# Patient Record
Sex: FEMALE | Race: BLACK OR AFRICAN AMERICAN
[De-identification: names, ages, dates, MRNs, and addresses within clinical notes are randomized per-mention and may not be internally consistent; named-entity substitution may affect disease eponyms.]

---

## 2017-06-03 ENCOUNTER — HOSPITAL ENCOUNTER (EMERGENCY)
Dept: HOSPITAL 62 - ER | Age: 27
Discharge: LEFT BEFORE DISCHARGE | End: 2017-06-03
Payer: COMMERCIAL

## 2017-06-03 VITALS — SYSTOLIC BLOOD PRESSURE: 118 MMHG | DIASTOLIC BLOOD PRESSURE: 65 MMHG

## 2017-06-03 DIAGNOSIS — Z53.21: Primary | ICD-10-CM

## 2017-06-06 ENCOUNTER — HOSPITAL ENCOUNTER (EMERGENCY)
Dept: HOSPITAL 62 - ER | Age: 27
Discharge: HOME | End: 2017-06-06
Payer: MEDICAID

## 2017-06-06 VITALS — DIASTOLIC BLOOD PRESSURE: 68 MMHG | SYSTOLIC BLOOD PRESSURE: 115 MMHG

## 2017-06-06 DIAGNOSIS — R10.2: Primary | ICD-10-CM

## 2017-06-06 DIAGNOSIS — R11.2: ICD-10-CM

## 2017-06-06 DIAGNOSIS — R10.9: ICD-10-CM

## 2017-06-06 DIAGNOSIS — Z33.1: ICD-10-CM

## 2017-06-06 LAB
APPEARANCE UR: (no result)
BASOPHILS # BLD AUTO: 0 10^3/UL (ref 0–0.2)
BASOPHILS NFR BLD AUTO: 0.2 % (ref 0–2)
BILIRUB UR QL STRIP: NEGATIVE
EOSINOPHIL # BLD AUTO: 0.1 10^3/UL (ref 0–0.6)
EOSINOPHIL NFR BLD AUTO: 1 % (ref 0–6)
ERYTHROCYTE [DISTWIDTH] IN BLOOD BY AUTOMATED COUNT: 13.5 % (ref 11.5–14)
GLUCOSE UR STRIP-MCNC: NEGATIVE MG/DL
HCT VFR BLD CALC: 40.5 % (ref 36–47)
HGB BLD-MCNC: 13.5 G/DL (ref 12–15.5)
HGB HCT DIFFERENCE: 0
KETONES UR STRIP-MCNC: NEGATIVE MG/DL
LYMPHOCYTES # BLD AUTO: 2.4 10^3/UL (ref 0.5–4.7)
LYMPHOCYTES NFR BLD AUTO: 22 % (ref 13–45)
MCH RBC QN AUTO: 30.8 PG (ref 27–33.4)
MCHC RBC AUTO-ENTMCNC: 33.4 G/DL (ref 32–36)
MCV RBC AUTO: 92 FL (ref 80–97)
MONOCYTES # BLD AUTO: 0.8 10^3/UL (ref 0.1–1.4)
MONOCYTES NFR BLD AUTO: 7.2 % (ref 3–13)
NEUTROPHILS # BLD AUTO: 7.5 10^3/UL (ref 1.7–8.2)
NEUTS SEG NFR BLD AUTO: 69.6 % (ref 42–78)
NITRITE UR QL STRIP: NEGATIVE
PH UR STRIP: 7 [PH] (ref 5–9)
PROT UR STRIP-MCNC: NEGATIVE MG/DL
RBC # BLD AUTO: 4.39 10^6/UL (ref 3.72–5.28)
SP GR UR STRIP: 1.03
UROBILINOGEN UR-MCNC: 2 MG/DL (ref ?–2)
WBC # BLD AUTO: 10.8 10^3/UL (ref 4–10.5)

## 2017-06-06 PROCEDURE — 81001 URINALYSIS AUTO W/SCOPE: CPT

## 2017-06-06 PROCEDURE — 76817 TRANSVAGINAL US OBSTETRIC: CPT

## 2017-06-06 PROCEDURE — 93976 VASCULAR STUDY: CPT

## 2017-06-06 PROCEDURE — 84702 CHORIONIC GONADOTROPIN TEST: CPT

## 2017-06-06 PROCEDURE — 99284 EMERGENCY DEPT VISIT MOD MDM: CPT

## 2017-06-06 PROCEDURE — 85025 COMPLETE CBC W/AUTO DIFF WBC: CPT

## 2017-06-06 PROCEDURE — 36415 COLL VENOUS BLD VENIPUNCTURE: CPT

## 2017-06-06 NOTE — RADIOLOGY REPORT (SQ)
EXAM DESCRIPTION:  U/S OB TRANSVAG W/DOPPLER



COMPLETED DATE/TIME:  2017 7:48 pm



REASON FOR STUDY:  Pelvic pain, Pregnancy



COMPARISON:  None.



TECHNIQUE:  Transvaginal static and realtime grayscale images acquired of the pelvis. Additional dinh
cted spectral and color Doppler images recorded. All images stored on PACs.

[facility] [place].3



LIMITATIONS:  None.



FINDINGS:  UTERUS: No masses.  No anomalies.

GESTATIONAL SAC: Yes.

YOLK SAC: Yes.

FETAL POLE: No.

RIGHT ADNEXA: There is a 1.7 x 2.3 x 1.5 cm cyst.

No adnexal free fluid.

No adnexal masses.

LEFT ADNEXA: Normal ovary with normal vascular flow.

No adnexal free fluid.

No adnexal masses.

FREE FLUID: There is minimal free fluid in the right adnexa.

OTHER: There is a small hypoechoic area adjacent to the gestational sac consistent with a small subch
orionic bleed.  This measures 0.9 x 0.6 x 0.5 cm.



IMPRESSION:  Probable early intrauterine gestation.

No fetal heart activity this time.  Followup beta HCG and ultrasound is recommended.

Trimester of pregnancy:  First - 0 to 13 weeks.



TECHNICAL DOCUMENTATION:  JOB ID:  [id number]

  [employer]- All Rights Reserved

## 2017-06-06 NOTE — ER DOCUMENT REPORT
ED Medical Screen (RME)





- General


TRAVEL OUTSIDE OF THE U[place] IN LAST 30 DAYS: No





<ABERCROMBIE,JOHN F - Last Filed: 17 17:24>





<ABRAN DAVIS - Last Filed: 17 18:21>





- General


Chief Complaint: Abdominal Pain


Stated Complaint: ABDOMINAL PAIN


Time Seen by Provider: 17 17:12


Notes: 


27-year-old female  with last menses 2017 presents with 1 week of 

lower abdominal cramping.  She has had 3 positive pregnancy tests at home.  She 

is having no vaginal bleeding, vomiting, diarrhea, dysuria or hematuria.  She 

has had some nausea. (ABERCROMBIE,JOHN F)


I have greeted and performed a rapid initial assessment of this patient. A 

comprehensive ED assessment and evaluation of the patient, analysis of test 

results and completion of the medical decision making process will be conducted 

by additional ED providers. (ABRAN DAVIS)





- Related Data


Allergies/Adverse Reactions: 


 





No Known Allergies Allergy (Verified 17 17:13)


 











Past Medical History


Renal/ Medical History: Denies: Hx Peritoneal Dialysis


Psychiatric Medical History: Reports: Hx Attention Deficit Hyperactivity 

Disorder


Traumatic Medical History: Reports: Hx Fractures





- Immunizations


Hx Diphtheria, Pertussis, Tetanus Vaccination: Yes





<ABERCROMBIE,JOHN F - Last Filed: 17 17:24>





Physical Exam





<ABERCROMBIE,JOHN F - Last Filed: 17 17:24>





<ABRAN DAVIS - Last Filed: 17 18:21>





- Vital signs


Vitals: 





 











Temp Pulse Resp BP Pulse Ox


 


 98.1 F   84   20   118/72   99 


 


 17 16:45  17 16:45  17 16:45  17 16:45  17 16:45














- Notes


Notes: 


Exam shows heart to be normal, lungs normal, no peritoneal signs (ABERCROMBIE, JOHN F)





Course





- Laboratory


Result Diagrams: 


 17 17:20








<ABRAN DAVIS - Last Filed: 17 18:21>





- Vital Signs


Vital signs: 





 











Temp Pulse Resp BP Pulse Ox


 


 98.1 F   84   20   118/72   99 


 


 17 16:45  17 16:45  17 16:45  17 16:45  17 16:45














- Laboratory


Laboratory results interpreted by me: 





 











  17





  17:20 17:20


 


WBC  10.8 H 


 


Urine Urobilinogen   2.0 H














Scribe Documentation





- Scribe


Written by Scribe:: Ilia Hawkins 17 18:20


acting as scribe for :: Abercrombie





<ABRAN DAVIS - Last Filed: 17 18:21>

## 2017-06-06 NOTE — ER DOCUMENT REPORT
ED GI/





- General


Mode of Arrival: Ambulatory


Information source: Patient


TRAVEL OUTSIDE OF THE [place] IN LAST 30 DAYS: No





- HPI


Patient complains to provider of: Abdominal pain





<MELVIN KAYE - Last Filed: 06/06/17 21:08>





<NIKO UNDERWOOD - Last Filed: 06/06/17 22:36>





- General


Chief Complaint: Abdominal Pain


Stated Complaint: ABDOMINAL PAIN


Time Seen by Provider: 06/06/17 17:12


Notes: 


Patient is a 27 year old female that presents to the emergency department today 

with complaints of nausea and vomiting. Patient states she is pregnant but she 

is unsure how pregnant she is. Patient states her LMP was April 30th. Patient 

states she has had mild suprapubic abdominal pain.  (MELVIN KAYE)





- Related Data


Allergies/Adverse Reactions: 


 





No Known Allergies Allergy (Verified 06/06/17 17:13)


 











Past Medical History





- General


Information source: Patient





- Social History


Smoking Status: Unknown if Ever Smoked


Cigarette use (# per day): No


Frequency of alcohol use: None


Drug Abuse: None


Lives with: Family


Family History: Reviewed & Not Pertinent


Patient has suicidal ideation: No


Patient has homicidal ideation: No


Psychiatric Medical History: Reports: Hx Attention Deficit Hyperactivity 

Disorder


Traumatic Medical History: Reports: Hx Fractures


Surgical Hx: Negative





- Immunizations


Hx Diphtheria, Pertussis, Tetanus Vaccination: Yes





<MELVIN KAYE - Last Filed: 06/06/17 21:08>





Review of Systems





- Review of Systems


Constitutional: No symptoms reported


EENT: No symptoms reported


Cardiovascular: No symptoms reported


Respiratory: No symptoms reported


Gastrointestinal: See HPI, Abdominal pain, Nausea, Vomiting


Genitourinary: No symptoms reported


Female Genitourinary: See HPI, Pregnant


Musculoskeletal: No symptoms reported


Skin: No symptoms reported


Hematologic/Lymphatic: No symptoms reported


Neurological/Psychological: No symptoms reported


-: Yes All other systems reviewed and negative





<MELVIN KAYE - Last Filed: 06/06/17 21:08>





Physical Exam





<MELVIN KAYE - Last Filed: 06/06/17 21:08>





- Vital signs


Interpretation: Normal





- General


General appearance: Appears well, Alert





- HEENT


Head: Normocephalic, Atraumatic


Eyes: Normal


Pupils: PERRL





- Respiratory


Respiratory status: No respiratory distress


Chest status: Nontender


Breath sounds: Normal


Chest palpation: Normal





- Cardiovascular


Rhythm: Regular


Heart sounds: Normal auscultation


Murmur: No





- Abdominal


Inspection: Normal


Distension: No distension


Bowel sounds: Normal


Organomegaly: No organomegaly





- Back


Back: Normal, Nontender





- Extremities


General upper extremity: Normal inspection, Nontender, Normal color, Normal ROM

, Normal temperature


General lower extremity: Normal inspection, Nontender, Normal color, Normal ROM

, Normal temperature, Normal weight bearing.  No: Katherine's sign





- Neurological


Neuro grossly intact: Yes


Cognition: Normal


Orientation: AAOx4


[place] Coma Scale Eye Opening: Spontaneous


[place] Coma Scale Verbal: Oriented


[place] Coma Scale Motor: Obeys Commands


[place] Coma Scale Total: 15


Speech: Normal


Motor strength normal: LUE, RUE, LLE, RLE


Sensory: Normal





- Psychological


Associated symptoms: Normal affect, Normal mood





- Skin


Skin Temperature: Warm


Skin Moisture: Dry


Skin Color: Normal





<NIKO UNDERWOOD - Last Filed: 06/06/17 22:36>





- Vital signs


Vitals: 


 











Temp Pulse Resp BP Pulse Ox


 


 98.1 F   84   20   118/72   99 


 


 06/06/17 16:45  06/06/17 16:45  06/06/17 16:45  06/06/17 16:45  06/06/17 16:45














- Notes


Notes: 


Physical Exam:


 


General: Alert, appears well. 


 


HEENT: Normocephalic. Atraumatic. PERRL. Extraocular movements intact. 

Oropharynx clear.


 


Neck: Supple. Non-tender.


 


Respiratory: No respiratory distress. Clear and equal breath sounds bilaterally.


 


Cardiovascular: Regular rate and rhythm. 


 


Abdominal: Left pelvic tenderness with palpation. No distension. Normal Bowel 

Sounds. 


 


Back: Non-tender. No deformity or step off.


 


Extremities: Moves all four extremities.


Upper extremities: Normal inspection. Normal ROM.  


Lower extremities: Normal inspection. No edema. Normal ROM.


 


Neurological: Normal cognition. AAOx4. Normal speech.  


 


Psychological: Normal affect. Normal Mood. 


 


Skin: Warm. Dry. Normal color. (MELVIN KAYE)





- Genitourinary


Notes: 


deferred to ob/gyn (NIKO UNDERWOOD)





Course





- Laboratory


Result Diagrams: 


 06/06/17 17:20








<MELVIN KAYE - Last Filed: 06/06/17 21:08>





- Laboratory


Result Diagrams: 


 06/06/17 17:20








- Diagnostic Test


Radiology reviewed: Reports reviewed





<NIKO UNDERWOOD - Last Filed: 06/06/17 22:36>





- Re-evaluation


Re-evalutation: 





06/06/17 


Patient with very early pregnancy.  No evidence for ectopic pregnancy.  Patient 

is comfortable and appears well.  No urinary symptoms.  She has been given a 

copy of her blood work and ultrasound and is to follow-up with obstetrics.  

Understands and agrees with plan.  No pain at this time.  Stable for discharge.


 (NIKO UNDERWOOD)





- Vital Signs


Vital signs: 


 











Temp Pulse Resp BP Pulse Ox


 


 98.4 F   79   20   115/68   99 


 


 06/06/17 20:40  06/06/17 20:40  06/06/17 20:40  06/06/17 20:40  06/06/17 20:40














- Laboratory


Laboratory results interpreted by me: 


 











  06/06/17 06/06/17 06/06/17





  17:20 17:20 17:20


 


WBC  10.8 H  


 


Beta HCG, Quant   5729.30 H 


 


Urine Urobilinogen    2.0 H














Discharge





<MELVIN KAYE - Last Filed: 06/06/17 21:08>





<NIKO UNDERWOOD - Last Filed: 06/06/17 22:36>





- Discharge


Clinical Impression: 


 Early stage of pregnancy, Pelvic pain





Condition: Stable


Disposition: HOME, SELF-CARE


Instructions:  Pelvic Pain ([facility]), Pregnancy ([facility])


Forms:  Return to Work


Referrals: 


[facility] [Provider Group] - Follow up as needed


Scribe Attestation: 





06/06/17 22:36


I personally performed the services described in the documentation, reviewed 

and edited the documentation which was dictated to the scribe in my presence, 

and it accurately records my words and actions. (NIKO UNDERWOOD)





Scribe Documentation





- Scribe


Written by Ilia:: Ilia Salazar, 6/6/2017 2053


acting as scribe for :: Nereyda





<MELVIN KAYE - Last Filed: 06/06/17 21:08>

## 2017-06-29 ENCOUNTER — HOSPITAL ENCOUNTER (EMERGENCY)
Dept: HOSPITAL 62 - ER | Age: 27
Discharge: HOME | End: 2017-06-29
Payer: COMMERCIAL

## 2017-06-29 VITALS — DIASTOLIC BLOOD PRESSURE: 72 MMHG | SYSTOLIC BLOOD PRESSURE: 127 MMHG

## 2017-06-29 DIAGNOSIS — Z79.899: ICD-10-CM

## 2017-06-29 DIAGNOSIS — Z87.891: ICD-10-CM

## 2017-06-29 DIAGNOSIS — O26.891: Primary | ICD-10-CM

## 2017-06-29 DIAGNOSIS — Z3A.08: ICD-10-CM

## 2017-06-29 DIAGNOSIS — R10.9: ICD-10-CM

## 2017-06-29 DIAGNOSIS — O20.8: ICD-10-CM

## 2017-06-29 LAB
ALBUMIN SERPL-MCNC: 4.1 G/DL (ref 3.5–5)
ALP SERPL-CCNC: 76 U/L (ref 38–126)
ALT SERPL-CCNC: 27 U/L (ref 9–52)
ANION GAP SERPL CALC-SCNC: 11 MMOL/L (ref 5–19)
APPEARANCE UR: (no result)
AST SERPL-CCNC: 20 U/L (ref 14–36)
BASOPHILS # BLD AUTO: 0 10^3/UL (ref 0–0.2)
BASOPHILS NFR BLD AUTO: 0.2 % (ref 0–2)
BILIRUB DIRECT SERPL-MCNC: 0.3 MG/DL (ref 0–0.4)
BILIRUB SERPL-MCNC: 0.4 MG/DL (ref 0.2–1.3)
BILIRUB UR QL STRIP: NEGATIVE
BUN SERPL-MCNC: 9 MG/DL (ref 7–20)
CALCIUM: 9.1 MG/DL (ref 8.4–10.2)
CHLORIDE SERPL-SCNC: 103 MMOL/L (ref 98–107)
CO2 SERPL-SCNC: 23 MMOL/L (ref 22–30)
CREAT SERPL-MCNC: 0.71 MG/DL (ref 0.52–1.25)
EOSINOPHIL # BLD AUTO: 0.1 10^3/UL (ref 0–0.6)
EOSINOPHIL NFR BLD AUTO: 1 % (ref 0–6)
ERYTHROCYTE [DISTWIDTH] IN BLOOD BY AUTOMATED COUNT: 13.6 % (ref 11.5–14)
GLUCOSE SERPL-MCNC: 85 MG/DL (ref 75–110)
GLUCOSE UR STRIP-MCNC: NEGATIVE MG/DL
HCT VFR BLD CALC: 38.9 % (ref 36–47)
HGB BLD-MCNC: 12.9 G/DL (ref 12–15.5)
HGB HCT DIFFERENCE: -0.2
KETONES UR STRIP-MCNC: NEGATIVE MG/DL
LYMPHOCYTES # BLD AUTO: 2.1 10^3/UL (ref 0.5–4.7)
LYMPHOCYTES NFR BLD AUTO: 18.9 % (ref 13–45)
MCH RBC QN AUTO: 30.7 PG (ref 27–33.4)
MCHC RBC AUTO-ENTMCNC: 33.2 G/DL (ref 32–36)
MCV RBC AUTO: 92 FL (ref 80–97)
MONOCYTES # BLD AUTO: 0.7 10^3/UL (ref 0.1–1.4)
MONOCYTES NFR BLD AUTO: 6.2 % (ref 3–13)
NEUTROPHILS # BLD AUTO: 8 10^3/UL (ref 1.7–8.2)
NEUTS SEG NFR BLD AUTO: 73.7 % (ref 42–78)
NITRITE UR QL STRIP: NEGATIVE
PH UR STRIP: 7 [PH] (ref 5–9)
POTASSIUM SERPL-SCNC: 4 MMOL/L (ref 3.6–5)
PROT SERPL-MCNC: 7 G/DL (ref 6.3–8.2)
PROT UR STRIP-MCNC: NEGATIVE MG/DL
RBC # BLD AUTO: 4.21 10^6/UL (ref 3.72–5.28)
SODIUM SERPL-SCNC: 136.6 MMOL/L (ref 137–145)
SP GR UR STRIP: 1.02
UROBILINOGEN UR-MCNC: NEGATIVE MG/DL (ref ?–2)
WBC # BLD AUTO: 10.9 10^3/UL (ref 4–10.5)

## 2017-06-29 PROCEDURE — 99284 EMERGENCY DEPT VISIT MOD MDM: CPT

## 2017-06-29 PROCEDURE — 84702 CHORIONIC GONADOTROPIN TEST: CPT

## 2017-06-29 PROCEDURE — 81001 URINALYSIS AUTO W/SCOPE: CPT

## 2017-06-29 PROCEDURE — 36415 COLL VENOUS BLD VENIPUNCTURE: CPT

## 2017-06-29 PROCEDURE — 93976 VASCULAR STUDY: CPT

## 2017-06-29 PROCEDURE — 80053 COMPREHEN METABOLIC PANEL: CPT

## 2017-06-29 PROCEDURE — 76817 TRANSVAGINAL US OBSTETRIC: CPT

## 2017-06-29 PROCEDURE — 85025 COMPLETE CBC W/AUTO DIFF WBC: CPT

## 2017-06-29 NOTE — RADIOLOGY REPORT (SQ)
EXAM DESCRIPTION:  U/S OB TRANSVAG W/DOPPLER



COMPLETED DATE/TIME:  6/29/2017 6:51 pm



REASON FOR STUDY:  abd pain preg



COMPARISON:  None.



TECHNIQUE:  Transvaginal static and realtime grayscale images acquired of the pelvis. Additional dinh
cted spectral and color Doppler images recorded. All images stored on PACs.

bHCG: Not available.



LIMITATIONS:  None.



FINDINGS:  FETUS: Living intrauterine pregnancy.

EGA: 8 week 6 day.

KORY: 2/2/2018.

FHR: 157  beats per minute.

SUBCHORIONIC BLEED: Yes.

SIZE OF BLEED: 0.3 x 1.2 x 1.9 cm.

UTERUS: No masses. No anomalies.

CERVICAL LENGTH: 3.5 cm.   Closed.

RIGHT ADNEXA: Normal ovary with normal vascular flow.

No adnexal free fluid.

Simple cyst(s) measuring 2.0 cm.

LEFT ADNEXA: Ovary not identified.

No adnexal free fluid.

No adnexal masses.

FREE FLUID: None.

OTHER: No other significant finding.



IMPRESSION:  LIVING INTRAUTERINE PREGNANCY.

EGA 8 WEEK 6 DAY.

Trimester of pregnancy:  First - 0 to 13 weeks.



TECHNICAL DOCUMENTATION:  JOB ID:  [id number]

 2011 [employer]- All Rights Reserved

## 2017-06-29 NOTE — ER DOCUMENT REPORT
ED General





- General


Chief Complaint: Pelvic Pain


Stated Complaint: CRAMPING/PREGNANT


Time Seen by Provider: 06/29/17 16:56


TRAVEL OUTSIDE OF THE [place] IN LAST 30 DAYS: No





- Related Data


Allergies/Adverse Reactions: 


 





latex Allergy (Verified 06/29/17 16:51)


 











Past Medical History





- General


Last Menstrual Period: 04/28/17





- Social History


Smoking Status: Former Smoker


Chew tobacco use (# tins/day): No


Frequency of alcohol use: None


Drug Abuse: None


Family History: Reviewed & Not Pertinent


Patient has suicidal ideation: No


Patient has homicidal ideation: No


Renal/ Medical History: Denies: Hx Peritoneal Dialysis


Psychiatric Medical History: Reports: Hx Attention Deficit Hyperactivity 

Disorder


Traumatic Medical History: Reports: Hx Fractures





- Immunizations


Hx Diphtheria, Pertussis, Tetanus Vaccination: Yes





Physical Exam





- Vital signs


Vitals: 





 











Temp Pulse Resp BP Pulse Ox


 


 99 F   70   24 H  127/74 H  99 


 


 06/29/17 16:51  06/29/17 16:51  06/29/17 16:51  06/29/17 16:51  06/29/17 16:51














Course





- Vital Signs


Vital signs: 





 











Temp Pulse Resp BP Pulse Ox


 


 99 F   70   24 H  127/74 H  99 


 


 06/29/17 16:51  06/29/17 16:51  06/29/17 16:51  06/29/17 16:51  06/29/17 16:51














- Laboratory


Result Diagrams: 


 06/29/17 17:13





 06/29/17 17:13


Laboratory results interpreted by me: 





 











  06/29/17 06/29/17





  17:13 17:13


 


WBC  10.9 H 


 


Sodium   136.6 L


 


Beta HCG, Quant   129242.00 H














Discharge





- Discharge


Clinical Impression: 


Pregnancy


Qualifiers:


 Weeks of gestation: 8 weeks Qualified Code(s): Z3A.08 - 8 weeks gestation of 

pregnancy





Abdominal pain during pregnancy


Qualifiers:


 Trimester: first trimester Qualified Code(s): O26.891 - Other specified 

pregnancy related conditions, first trimester; R10.9 - Unspecified abdominal 

pain





Condition: Good


Disposition: HOME, SELF-CARE


Instructions:  Abdominal Pain ([facility]), Pelvic Pain in Pregnancy ([facility])


Additional Instructions: 


Continue your prenatal vitamins follow-up with your OB/GYN.  He may take the 

Reglan provided for very bad nausea.





For nausea and vomiting during pregnancy I recomment:


Start with 10-12.5 mg of pyridoxine (vitamin B6) three times a day for 2 days. 

If not fully effective,


Increase to 12.5 mg of pyridoxine four times a day for 2 days. If not fully 

effective,


Increase to 25 mg of pyridoxine three times a day for 2 days. If not fully 

effective,


Continue 25 mg pyridoxine 3 times a day, and add 12.5 mg of doxylamine before 

bedtime each day for 2 days. If not fully effective,


Continue 25 mg pyridoxine 3 times a day, and take 12.5 mg of doxylamine twice a 

day. If not fully effective,


Continue 25 mg pyridoxine 3 times a day, and take 12.5 mg of doxylamine three 

times a day. If not fully effective,


Continue 25 mg pyridoxine 3 times a day, and 12.5 mg of doxylamine 3 times a day

, while adding Emetrol, one to two tablespoons (15-30 cc) taken once or twice a 

day as needed. (Emetrol is an over-the-counter mixture of sugar syrups and 

phosphoric acid [phosphorylated carbohydrate solution]) that acts by soothing 

the actual wall of the gastrointestinal tract). If not fully effective,


Consult with your doctor.


Prescriptions: 


Metoclopramide HCl [Reglan] 5 mg PO Q6 #20 tablet


Forms:  Return to Work

## 2017-06-29 NOTE — ER DOCUMENT REPORT
ED General





- General


Chief Complaint: Pelvic Pain


Stated Complaint: CRAMPING/PREGNANT


Time Seen by Provider: 17 16:56


TRAVEL OUTSIDE OF THE [place] IN LAST 30 DAYS: No





- HPI


Patient complains to provider of: Abdominal pain in pregnancy


Notes: 


Patient coming in for evaluation of abdominal pain and pregnancy.  Patient is a 

 with no comp occasions and her prior pregnancies states that she is 

having abdominal cramping.  Patient states that she is approximately 6-8 weeks 

pregnant.  Patient states compliance with prenatal vitamins.  Denies any fevers 

chills nausea vomiting dysuria





- Related Data


Allergies/Adverse Reactions: 


 





latex Allergy (Verified 17 16:51)


 











Past Medical History





- General


Last Menstrual Period: 17





- Social History


Smoking Status: Former Smoker


Chew tobacco use (# tins/day): No


Frequency of alcohol use: None


Drug Abuse: None


Family History: Reviewed & Not Pertinent


Patient has suicidal ideation: No


Patient has homicidal ideation: No


Renal/ Medical History: Denies: Hx Peritoneal Dialysis


Psychiatric Medical History: Reports: Hx Attention Deficit Hyperactivity 

Disorder


Traumatic Medical History: Reports: Hx Fractures





- Immunizations


Hx Diphtheria, Pertussis, Tetanus Vaccination: Yes





Review of Systems





- Review of Systems


Constitutional: No symptoms reported


EENT: No symptoms reported


Cardiovascular: No symptoms reported


Respiratory: No symptoms reported


Gastrointestinal: Abdominal pain


Genitourinary: No symptoms reported


Female Genitourinary: No symptoms reported


Musculoskeletal: No symptoms reported


Skin: No symptoms reported


Hematologic/Lymphatic: No symptoms reported


Neurological/Psychological: No symptoms reported


-: Yes All other systems reviewed and negative





Physical Exam





- Vital signs


Vitals: 





 











Temp Pulse Resp BP Pulse Ox


 


 99 F   70   24 H  127/74 H  99 


 


 17 16:51  17 16:51  17 16:51  17 16:51  17 16:51











Interpretation: Normal





- General


General appearance: Appears well, Alert





- HEENT


Head: Normocephalic, Atraumatic


Eyes: Normal


Pupils: PERRL





- Respiratory


Respiratory status: No respiratory distress


Chest status: Nontender


Breath sounds: Normal


Chest palpation: Normal





- Cardiovascular


Rhythm: Regular


Heart sounds: Normal auscultation


Murmur: No





- Abdominal


Inspection: Normal


Distension: No distension


Bowel sounds: Normal


Tenderness: Nontender


Organomegaly: No organomegaly





- Back


Back: Normal, Nontender





- Extremities


General upper extremity: Normal inspection, Nontender, Normal color, Normal ROM

, Normal temperature


General lower extremity: Normal inspection, Nontender, Normal color, Normal ROM

, Normal temperature, Normal weight bearing.  No: Katherine's sign





- Neurological


Neuro grossly intact: Yes


Cognition: Normal


Orientation: AAOx4


[place] Coma Scale Eye Opening: Spontaneous


[place] Coma Scale Verbal: Oriented


[place] Coma Scale Motor: Obeys Commands


[place] Coma Scale Total: 15


Speech: Normal


Motor strength normal: LUE, RUE, LLE, RLE


Sensory: Normal





- Psychological


Associated symptoms: Normal affect, Normal mood





- Skin


Skin Temperature: Warm


Skin Moisture: Dry


Skin Color: Normal





Course





- Re-evaluation


Re-evalutation: 





17 19:19


Ultrasound confirms IUP with a small sub-chorionic bleed.  Lab was unrevealing 

critical pathology.  Patient will be discharged home to follow-up with her 

primary care physician.  The patient presents with abdominal pain without signs 

of peritonitis or other life-threatening or serious etiology. The patient 

appears stable for discharge and has been instructed to return immediately if 

the symptoms worsen in any way, or in 8-12hr if not improved for re-evaluation. 

The patient has been instructed to return if the symptoms worsen or change in 

any way.





- Vital Signs


Vital signs: 





 











Temp Pulse Resp BP Pulse Ox


 


 99 F   70   24 H  127/74 H  99 


 


 17 16:51  17 16:51  17 16:51  17 16:51  17 16:51














- Laboratory


Result Diagrams: 


 17 17:13





 17 17:13


Laboratory results interpreted by me: 





 











  17





  17:13 17:13


 


WBC  10.9 H 


 


Sodium   136.6 L


 


Beta HCG, Quant   726105.00 H














Discharge





- Discharge


Clinical Impression: 


Pregnancy


Qualifiers:


 Weeks of gestation: 8 weeks Qualified Code(s): Z3A.08 - 8 weeks gestation of 

pregnancy





Abdominal pain during pregnancy


Qualifiers:


 Trimester: first trimester Qualified Code(s): O26.891 - Other specified 

pregnancy related conditions, first trimester





Condition: Good


Disposition: HOME, SELF-CARE


Instructions:  Abdominal Pain ([facility]), Pelvic Pain in Pregnancy ([facility])


Additional Instructions: 


Continue your prenatal vitamins follow-up with your OB/GYN.  He may take the 

Reglan provided for very bad nausea.





For nausea and vomiting during pregnancy I recomment:


Start with 10-12.5 mg of pyridoxine (vitamin B6) three times a day for 2 days. 

If not fully effective,


Increase to 12.5 mg of pyridoxine four times a day for 2 days. If not fully 

effective,


Increase to 25 mg of pyridoxine three times a day for 2 days. If not fully 

effective,


Continue 25 mg pyridoxine 3 times a day, and add 12.5 mg of doxylamine before 

bedtime each day for 2 days. If not fully effective,


Continue 25 mg pyridoxine 3 times a day, and take 12.5 mg of doxylamine twice a 

day. If not fully effective,


Continue 25 mg pyridoxine 3 times a day, and take 12.5 mg of doxylamine three 

times a day. If not fully effective,


Continue 25 mg pyridoxine 3 times a day, and 12.5 mg of doxylamine 3 times a day

, while adding Emetrol, one to two tablespoons (15-30 cc) taken once or twice a 

day as needed. (Emetrol is an over-the-counter mixture of sugar syrups and 

phosphoric acid [phosphorylated carbohydrate solution]) that acts by soothing 

the actual wall of the gastrointestinal tract). If not fully effective,


Consult with your doctor.


Prescriptions: 


Metoclopramide HCl [Reglan] 5 mg PO Q6 #20 tablet


Forms:  Return to Work

## 2017-07-13 ENCOUNTER — HOSPITAL ENCOUNTER (OUTPATIENT)
Dept: HOSPITAL 62 - RAD | Age: 27
End: 2017-07-13
Attending: NURSE PRACTITIONER
Payer: MEDICAID

## 2017-07-13 DIAGNOSIS — Z34.81: Primary | ICD-10-CM

## 2017-07-13 PROCEDURE — 76801 OB US < 14 WKS SINGLE FETUS: CPT

## 2017-07-13 NOTE — RADIOLOGY REPORT (SQ)
EXAM DESCRIPTION:  U/S [id number] TRNABD 1GES W/ODOP



COMPLETED DATE/TIME:  7/13/2017 4:48 pm



REASON FOR STUDY:  ENCOUNTER FOR SUPERVISON OF OTHER NORMAL PREGANCNY, FIRST TRIMESTER Z34.81  ENCOUN
TER FOR SUPRVSN OF NORMAL PREGNANCY, FIRST TRIM



COMPARISON:  None.



TECHNIQUE:  Transabdominal static and realtime grayscale images acquired of the pelvis. Additional se
lected spectral and color Doppler images recorded. All images stored on PACs.

bHCG: Not applicable.



LIMITATIONS:  None.



FINDINGS:  FETUS:

EGA: 11 week 2 day.

KORY: 1/30/2018.

EFW: Not applicable.

FHR: 178 beats per minute.

KADIE: Adequate amount.

CERVICAL LENGTH: 3.8 cm.   Closed.

UTERUS: No masses.

RIGHT ADNEXA: Normal ovary with normal vascular flow.

No adnexal free fluid.

2.1 cm simple cyst.

LEFT ADNEXA: Ovary not identified.

No adnexal free fluid.

No adnexal masses.

FREE FLUID: None.

OTHER: No other significant finding.



IMPRESSION:  LIVING INTRAUTERINE PREGNANCY.

ESTIMATED GESTATIONAL AGE:11 WEEK 2 DAY.

Trimester of pregnancy: First trimester - 0 to 13 weeks.



TECHNICAL DOCUMENTATION:  JOB ID:  [id number]

 2011 [employer]- All Rights Reserved

## 2017-08-03 ENCOUNTER — HOSPITAL ENCOUNTER (EMERGENCY)
Dept: HOSPITAL 62 - ER | Age: 27
Discharge: HOME | End: 2017-08-03
Payer: COMMERCIAL

## 2017-08-03 VITALS — DIASTOLIC BLOOD PRESSURE: 75 MMHG | SYSTOLIC BLOOD PRESSURE: 114 MMHG

## 2017-08-03 DIAGNOSIS — R51: ICD-10-CM

## 2017-08-03 DIAGNOSIS — Z34.90: Primary | ICD-10-CM

## 2017-08-03 DIAGNOSIS — M54.2: ICD-10-CM

## 2017-08-03 PROCEDURE — 70450 CT HEAD/BRAIN W/O DYE: CPT

## 2017-08-03 PROCEDURE — 96374 THER/PROPH/DIAG INJ IV PUSH: CPT

## 2017-08-03 PROCEDURE — 99284 EMERGENCY DEPT VISIT MOD MDM: CPT

## 2017-08-03 PROCEDURE — 96361 HYDRATE IV INFUSION ADD-ON: CPT

## 2017-08-03 PROCEDURE — 96375 TX/PRO/DX INJ NEW DRUG ADDON: CPT

## 2017-08-03 NOTE — ER DOCUMENT REPORT
ED Headache





- General


Chief Complaint: Headache


Stated Complaint: NECK PAIN,HEADACHE


Time Seen by Provider: 08/03/17 12:30


Mode of Arrival: Ambulatory


Information source: Patient


Notes: 


Patient presents complaining of high pain off and on that she describes as a 

pressure for the past year.  Patient reports pain to the lateral sides of her 

neck for the past 2 months and reports constant headache for the past month.  

Patient states that she has taken Tylenol at home without improvement of her 

symptoms.  Patient does report some light sensitivity and nausea.  Patient 

denies any fever, vomiting, or head injury.  Patient denies any history of IV 

drug use.  Patient states that she has seen her ophthalmologist regarding the 

pressure sensation in her eyes that she has had for the past year and she was 

told she had a normal exam with normal intraocular pressure.  Patient states 

she simply got tired of her symptoms today which prompted her to come in.  

Patient is currently 4 months pregnant and [facility]


TRAVEL OUTSIDE OF THE [place] IN LAST 30 DAYS: No





- HPI


Patient complains to provider of: Headache


Onset: Other - 1 month


Onset was: Gradual


Timing: Still present


Quality of pain: Achy


Pain Level: 3


Associated symptoms: Photophobia.  denies: Confusion, Dizzy, Fainting, Fever, 

Speech problems, Stiff neck, Trouble walking


Exacerbated by: Light


Similar symptoms previously: Yes


Recently seen / treated by doctor: No





- Related Data


Allergies/Adverse Reactions: 


 





latex Allergy (Verified 08/03/17 12:16)


 











Past Medical History





- General


Information source: Patient


Last Menstrual Period: 4 mo pregnant





- Social History


Smoking Status: Never Smoker


Frequency of alcohol use: None


Drug Abuse: None


Occupation: 


Family History: Reviewed & Not Pertinent


Renal/ Medical History: Denies: Hx Peritoneal Dialysis


Psychiatric Medical History: Reports: Hx Attention Deficit Hyperactivity 

Disorder


Traumatic Medical History: Reports: Hx Fractures


Past Surgical History: Reports: Hx Oral Surgery





- Immunizations


Hx Diphtheria, Pertussis, Tetanus Vaccination: Yes





Review of Systems





- Review of Systems


Constitutional: No symptoms reported.  denies: Fever


EENT: Eye pain


Cardiovascular: No symptoms reported.  denies: Dizziness


Respiratory: No symptoms reported.  denies: Cough, Short of breath


Gastrointestinal: No symptoms reported.  denies: Vomiting


Genitourinary: No symptoms reported


Female Genitourinary: No symptoms reported


Musculoskeletal: Neck pain


Skin: No symptoms reported.  denies: Rash


Hematologic/Lymphatic: No symptoms reported


Neurological/Psychological: Headaches.  denies: Weakness





Physical Exam





- Vital signs


Vitals: 


 











Temp Pulse Resp BP Pulse Ox


 


 98.4 F   80   14   123/70   99 


 


 08/03/17 12:16  08/03/17 12:16  08/03/17 12:16  08/03/17 12:16  08/03/17 12:16














- General


General appearance: Appears well, Alert


In distress: None





- HEENT


Head: Normocephalic, Atraumatic


Eyes: Normal


Extraocular movements intact: Yes


Eyelashes: Normal


Pupils: PERRL


Ears: Normal


External canal: Normal


Tympanic membrane: Normal


Nasal: Normal


Mouth/Lips: Normal


Mucous membranes: Normal


Neck: Supple, Other - bilat SCM muscle tenderness/spasm.  No: Lymphadenopathy, 

Meningismus





- Respiratory


Respiratory status: No respiratory distress


Chest status: Nontender


Breath sounds: Normal





- Cardiovascular


Rhythm: Regular


Heart sounds: S1 appreciated, S2 appreciated


Murmur: No





- Back


Back: Normal, Nontender.  No: Vertebra tenderness





- Extremities


General upper extremity: Normal inspection, Normal ROM


General lower extremity: Normal inspection, Normal ROM





- Neurological


Neuro grossly intact: Yes


Cognition: Normal


Orientation: AAOx4


[place] Coma Scale Eye Opening: Spontaneous


[place] Coma Scale Verbal: Oriented


[place] Coma Scale Motor: Obeys Commands


[place] Coma Scale Total: 15


Speech: Normal


Cranial nerves: Normal


Cerebellar coordination: Normal, Heel-shin, Finger-nose rhombey.  No: Gait 

ataxia


Motor strength normal: LUE, RUE, LLE, RLE





- Psychological


Associated symptoms: Normal affect, Normal mood





- Skin


Skin Temperature: Warm


Skin Moisture: Dry


Skin Color: Normal





Course





- Re-evaluation


Re-evalutation: 





08/03/17 15:02


Resting with eyes closed, arouses easily to voice.  Patient denies any headache 

pain at this time.





- Vital Signs


Vital signs: 


 











Temp Pulse Resp BP Pulse Ox


 


 97.5 F   101 H  20   114/75   100 


 


 08/03/17 13:18  08/03/17 13:38  08/03/17 13:18  08/03/17 13:38  08/03/17 13:38














- Diagnostic Test


Radiology reviewed: Reports reviewed





Discharge





- Discharge


Clinical Impression: 


Pregnancy


Qualifiers:


 Weeks of gestation: unspecified Qualified Code(s): Z34.90 - Encounter for 

supervision of normal pregnancy, unspecified, unspecified trimester





Headache


Qualifiers:


 Headache type: unspecified Headache chronicity pattern: unspecified pattern 

Intractability: not intractable Qualified Code(s): R51 - Headache





Condition: Stable


Disposition: HOME, SELF-CARE


Instructions:  Use of Diphenhydramine, Reglan ([facility]), Headache ([facility])


Additional Instructions: 


Return immediately for any new or worsening symptoms





Followup with your primary care provider, call tomorrow to make a followup 

appointment





Follow-up with your primary doctor for further evaluation of chronic daily 

headaches





Tylenol over-the-counter as directed for pain relief


Forms:  Return to Work


Referrals: 


OSCAR ROMANO MD [Primary Care Provider] - Follow up tomorrow
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
## 2017-09-01 ENCOUNTER — HOSPITAL ENCOUNTER (EMERGENCY)
Dept: HOSPITAL 62 - ER | Age: 27
Discharge: HOME | End: 2017-09-01
Payer: COMMERCIAL

## 2017-09-01 DIAGNOSIS — O9A.212: Primary | ICD-10-CM

## 2017-09-01 DIAGNOSIS — S69.91XA: ICD-10-CM

## 2017-09-01 DIAGNOSIS — O99.89: ICD-10-CM

## 2017-09-01 DIAGNOSIS — Z91.040: ICD-10-CM

## 2017-09-01 DIAGNOSIS — Z3A.18: ICD-10-CM
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
## 2017-09-01 DIAGNOSIS — M79.641: ICD-10-CM

## 2017-09-01 PROCEDURE — 99283 EMERGENCY DEPT VISIT LOW MDM: CPT
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
## 2017-12-06 ENCOUNTER — HOSPITAL ENCOUNTER (OUTPATIENT)
Dept: HOSPITAL 62 - LC | Age: 27
Discharge: HOME | End: 2017-12-06
Attending: OBSTETRICS & GYNECOLOGY
Payer: COMMERCIAL

## 2017-12-06 DIAGNOSIS — Z3A.31: ICD-10-CM

## 2017-12-06 DIAGNOSIS — O47.03: Primary | ICD-10-CM

## 2017-12-06 LAB
APPEARANCE UR: (no result)
BARBITURATES UR QL SCN: NEGATIVE
BILIRUB UR QL STRIP: NEGATIVE
GLUCOSE UR STRIP-MCNC: 50 MG/DL
KETONES UR STRIP-MCNC: NEGATIVE MG/DL
METHADONE UR QL SCN: NEGATIVE
NITRITE UR QL STRIP: NEGATIVE
PCP UR QL SCN: NEGATIVE
PH UR STRIP: 7 [PH] (ref 5–9)
PROT UR STRIP-MCNC: NEGATIVE MG/DL
SP GR UR STRIP: 1.01
URINE OPIATES LOW: NEGATIVE
UROBILINOGEN UR-MCNC: 2 MG/DL (ref ?–2)

## 2017-12-06 PROCEDURE — 80307 DRUG TEST PRSMV CHEM ANLYZR: CPT

## 2017-12-06 PROCEDURE — 81001 URINALYSIS AUTO W/SCOPE: CPT

## 2017-12-06 PROCEDURE — 4A1HXCZ MONITORING OF PRODUCTS OF CONCEPTION, CARDIAC RATE, EXTERNAL APPROACH: ICD-10-PCS | Performed by: OBSTETRICS & GYNECOLOGY

## 2018-01-16 ENCOUNTER — HOSPITAL ENCOUNTER (OUTPATIENT)
Dept: HOSPITAL 62 - LC | Age: 28
Discharge: HOME | End: 2018-01-16
Attending: OBSTETRICS & GYNECOLOGY
Payer: MEDICAID

## 2018-01-16 DIAGNOSIS — Z3A.37: ICD-10-CM

## 2018-01-16 DIAGNOSIS — O47.1: Primary | ICD-10-CM

## 2018-01-16 LAB
A1 MICROGLOB PLACENTAL VAG QL: NEGATIVE
APPEARANCE UR: (no result)
APTT PPP: YELLOW S
BARBITURATES UR QL SCN: NEGATIVE
BILIRUB UR QL STRIP: NEGATIVE
GLUCOSE UR STRIP-MCNC: 50 MG/DL
KETONES UR STRIP-MCNC: NEGATIVE MG/DL
METHADONE UR QL SCN: NEGATIVE
NITRITE UR QL STRIP: NEGATIVE
PCP UR QL SCN: NEGATIVE
PH UR STRIP: 7 [PH] (ref 5–9)
PROT UR STRIP-MCNC: NEGATIVE MG/DL
SP GR UR STRIP: 1.01
URINE AMPHETAMINES SCREEN: NEGATIVE
URINE BENZODIAZEPINES SCREEN: NEGATIVE
URINE COCAINE SCREEN: NEGATIVE
URINE MARIJUANA (THC) SCREEN: NEGATIVE
UROBILINOGEN UR-MCNC: 2 MG/DL (ref ?–2)

## 2018-01-16 PROCEDURE — 84112 EVAL AMNIOTIC FLUID PROTEIN: CPT

## 2018-01-16 PROCEDURE — 80307 DRUG TEST PRSMV CHEM ANLYZR: CPT

## 2018-01-16 PROCEDURE — 81001 URINALYSIS AUTO W/SCOPE: CPT

## 2018-01-16 PROCEDURE — 4A1HXCZ MONITORING OF PRODUCTS OF CONCEPTION, CARDIAC RATE, EXTERNAL APPROACH: ICD-10-PCS | Performed by: OBSTETRICS & GYNECOLOGY

## 2018-01-16 PROCEDURE — 59025 FETAL NON-STRESS TEST: CPT

## 2018-01-16 NOTE — NON STRESS TEST REPORT
=================================================================

Non Stress Test

=================================================================

Datetime Report Generated by CPN: 01/16/2018 18:48

   

   

=================================================================

DEMOGRAPHIC

=================================================================

   

EGA NST:  37.4

   

=================================================================

INDICATION

=================================================================

   

Indication for Study:  Ordered by Provider

   

=================================================================

MONITORING

=================================================================

   

Monitor Explained:  Monitor Explained; Test Explained; Patient

   Verbalized Understanding

Time on Monitor:  01/16/2018 18:17

Time off Monitor:  01/16/2018 18:45

NST Duration:  28

   

=================================================================

NST INTERVENTIONS

=================================================================

   

NST Interventions:  PO Hydration

Physician Notified NST:  Dr. Clark

BABY A:  [id number]

   

=================================================================

BABY A

=================================================================

   

Fetal Movement :  Present

Contraction Frequency :  1

FHR Baseline :  145

Accelerations :  15X15

Decelerations :  None

Variability :  Moderate 6-25bpm

NST Review:  Meets Criteria for Reactive NST

NST Review and Verified By :  CAROLE Wells RN

NST Results:  Reactive

   

=================================================================

NST REPORT

=================================================================

   

Report Trigger:  Send Report

## 2018-01-17 ENCOUNTER — HOSPITAL ENCOUNTER (OUTPATIENT)
Dept: HOSPITAL 62 - LC | Age: 28
Discharge: HOME | End: 2018-01-17
Attending: OBSTETRICS & GYNECOLOGY
Payer: MEDICAID

## 2018-01-17 DIAGNOSIS — O47.1: Primary | ICD-10-CM

## 2018-01-17 DIAGNOSIS — Z3A.37: ICD-10-CM

## 2018-01-17 LAB
A1 MICROGLOB PLACENTAL VAG QL: NEGATIVE
APPEARANCE UR: (no result)
APTT PPP: YELLOW S
BARBITURATES UR QL SCN: NEGATIVE
BILIRUB UR QL STRIP: NEGATIVE
GLUCOSE UR STRIP-MCNC: 50 MG/DL
KETONES UR STRIP-MCNC: NEGATIVE MG/DL
METHADONE UR QL SCN: NEGATIVE
NITRITE UR QL STRIP: NEGATIVE
PCP UR QL SCN: NEGATIVE
PH UR STRIP: 6 [PH] (ref 5–9)
PROT UR STRIP-MCNC: NEGATIVE MG/DL
SP GR UR STRIP: 1.01
URINE AMPHETAMINES SCREEN: NEGATIVE
URINE BENZODIAZEPINES SCREEN: NEGATIVE
URINE COCAINE SCREEN: NEGATIVE
URINE MARIJUANA (THC) SCREEN: NEGATIVE
UROBILINOGEN UR-MCNC: 4 MG/DL (ref ?–2)

## 2018-01-17 PROCEDURE — 4A1HXCZ MONITORING OF PRODUCTS OF CONCEPTION, CARDIAC RATE, EXTERNAL APPROACH: ICD-10-PCS | Performed by: OBSTETRICS & GYNECOLOGY

## 2018-01-17 PROCEDURE — 84112 EVAL AMNIOTIC FLUID PROTEIN: CPT

## 2018-01-17 PROCEDURE — 59025 FETAL NON-STRESS TEST: CPT

## 2018-01-17 PROCEDURE — 81005 URINALYSIS: CPT

## 2018-01-17 PROCEDURE — 80307 DRUG TEST PRSMV CHEM ANLYZR: CPT

## 2018-01-17 NOTE — NON STRESS TEST REPORT
=================================================================

Non Stress Test

=================================================================

Datetime Report Generated by CPN: 01/17/2018 19:29

   

   

=================================================================

DEMOGRAPHIC

=================================================================

   

EGA NST:  37.5

   

=================================================================

INDICATION

=================================================================

   

Indication for Study:  Ordered by Provider

   

=================================================================

MONITORING

=================================================================

   

Monitor Explained:  Monitor Explained; Test Explained; Patient

   Verbalized Understanding

Time on Monitor:  01/17/2018 17:52

Time off Monitor:  01/17/2018 18:54

NST Duration:  62

   

=================================================================

NST INTERVENTIONS

=================================================================

   

NST Interventions:  PO Hydration; Reposition Patient

Physician Notified NST:  A.Emmel, CNM

BABY A:  [id number]

   

=================================================================

BABY A

=================================================================

   

Fetal Movement :  Present

Contraction Frequency :  x1

FHR Baseline :  145

Accelerations :  15X15

Decelerations :  None

Variability :  Moderate 6-25bpm

NST Review:  Meets Criteria for Reactive NST

NST Review and Verified By :  DEBORAH Capellan RN

NST Results:  Reactive

   

=================================================================

NST REPORT

=================================================================

   

Report Trigger:  Send Report

## 2018-01-21 ENCOUNTER — HOSPITAL ENCOUNTER (OUTPATIENT)
Dept: HOSPITAL 62 - LC | Age: 28
Discharge: HOME | End: 2018-01-21
Attending: OBSTETRICS & GYNECOLOGY
Payer: MEDICAID

## 2018-01-21 DIAGNOSIS — Z3A.38: ICD-10-CM

## 2018-01-21 DIAGNOSIS — R11.2: ICD-10-CM

## 2018-01-21 DIAGNOSIS — O47.1: Primary | ICD-10-CM

## 2018-01-21 LAB
APPEARANCE UR: CLEAR
APTT PPP: YELLOW S
BARBITURATES UR QL SCN: NEGATIVE
BILIRUB UR QL STRIP: NEGATIVE
GLUCOSE UR STRIP-MCNC: NEGATIVE MG/DL
KETONES UR STRIP-MCNC: NEGATIVE MG/DL
METHADONE UR QL SCN: NEGATIVE
NITRITE UR QL STRIP: NEGATIVE
PCP UR QL SCN: NEGATIVE
PH UR STRIP: 7 [PH] (ref 5–9)
PROT UR STRIP-MCNC: NEGATIVE MG/DL
SP GR UR STRIP: 1.01
URINE AMPHETAMINES SCREEN: NEGATIVE
URINE BENZODIAZEPINES SCREEN: NEGATIVE
URINE COCAINE SCREEN: NEGATIVE
URINE MARIJUANA (THC) SCREEN: NEGATIVE
UROBILINOGEN UR-MCNC: NEGATIVE MG/DL (ref ?–2)

## 2018-01-21 PROCEDURE — 4A1HXCZ MONITORING OF PRODUCTS OF CONCEPTION, CARDIAC RATE, EXTERNAL APPROACH: ICD-10-PCS | Performed by: OBSTETRICS & GYNECOLOGY

## 2018-01-21 PROCEDURE — 81001 URINALYSIS AUTO W/SCOPE: CPT

## 2018-01-21 PROCEDURE — 80307 DRUG TEST PRSMV CHEM ANLYZR: CPT

## 2018-01-22 ENCOUNTER — HOSPITAL ENCOUNTER (INPATIENT)
Dept: HOSPITAL 62 - LC | Age: 28
LOS: 3 days | Discharge: HOME | End: 2018-01-25
Attending: OBSTETRICS & GYNECOLOGY | Admitting: OBSTETRICS & GYNECOLOGY
Payer: MEDICAID

## 2018-01-22 DIAGNOSIS — Z87.891: ICD-10-CM

## 2018-01-22 LAB
ADD MANUAL DIFF: NO
APPEARANCE UR: (no result)
APTT PPP: YELLOW S
BARBITURATES UR QL SCN: NEGATIVE
BASOPHILS # BLD AUTO: 0.1 10^3/UL (ref 0–0.2)
BASOPHILS NFR BLD AUTO: 0.5 % (ref 0–2)
BILIRUB UR QL STRIP: NEGATIVE
EOSINOPHIL # BLD AUTO: 0 10^3/UL (ref 0–0.6)
EOSINOPHIL NFR BLD AUTO: 0.1 % (ref 0–6)
ERYTHROCYTE [DISTWIDTH] IN BLOOD BY AUTOMATED COUNT: 14 % (ref 11.5–14)
GLUCOSE UR STRIP-MCNC: NEGATIVE MG/DL
HCT VFR BLD CALC: 37.9 % (ref 36–47)
HGB BLD-MCNC: 12.6 G/DL (ref 12–15.5)
KETONES UR STRIP-MCNC: 20 MG/DL
LYMPHOCYTES # BLD AUTO: 1.4 10^3/UL (ref 0.5–4.7)
LYMPHOCYTES NFR BLD AUTO: 9.7 % (ref 13–45)
MCH RBC QN AUTO: 29.9 PG (ref 27–33.4)
MCHC RBC AUTO-ENTMCNC: 33.3 G/DL (ref 32–36)
MCV RBC AUTO: 90 FL (ref 80–97)
METHADONE UR QL SCN: NEGATIVE
MONOCYTES # BLD AUTO: 1.1 10^3/UL (ref 0.1–1.4)
MONOCYTES NFR BLD AUTO: 7.3 % (ref 3–13)
NEUTROPHILS # BLD AUTO: 12.2 10^3/UL (ref 1.7–8.2)
NEUTS SEG NFR BLD AUTO: 82.4 % (ref 42–78)
NITRITE UR QL STRIP: NEGATIVE
PCP UR QL SCN: NEGATIVE
PH UR STRIP: 7 [PH] (ref 5–9)
PLATELET # BLD: 139 10^3/UL (ref 150–450)
PROT UR STRIP-MCNC: NEGATIVE MG/DL
RBC # BLD AUTO: 4.23 10^6/UL (ref 3.72–5.28)
SP GR UR STRIP: 1.01
TOTAL CELLS COUNTED % (AUTO): 100 %
URINE AMPHETAMINES SCREEN: NEGATIVE
URINE BENZODIAZEPINES SCREEN: NEGATIVE
URINE COCAINE SCREEN: NEGATIVE
URINE MARIJUANA (THC) SCREEN: NEGATIVE
UROBILINOGEN UR-MCNC: 4 MG/DL (ref ?–2)
WBC # BLD AUTO: 14.8 10^3/UL (ref 4–10.5)

## 2018-01-22 PROCEDURE — 86850 RBC ANTIBODY SCREEN: CPT

## 2018-01-22 PROCEDURE — 81005 URINALYSIS: CPT

## 2018-01-22 PROCEDURE — 86592 SYPHILIS TEST NON-TREP QUAL: CPT

## 2018-01-22 PROCEDURE — 86900 BLOOD TYPING SEROLOGIC ABO: CPT

## 2018-01-22 PROCEDURE — 85027 COMPLETE CBC AUTOMATED: CPT

## 2018-01-22 PROCEDURE — 86901 BLOOD TYPING SEROLOGIC RH(D): CPT

## 2018-01-22 PROCEDURE — 94760 N-INVAS EAR/PLS OXIMETRY 1: CPT

## 2018-01-22 PROCEDURE — 85025 COMPLETE CBC W/AUTO DIFF WBC: CPT

## 2018-01-22 PROCEDURE — 80307 DRUG TEST PRSMV CHEM ANLYZR: CPT

## 2018-01-22 PROCEDURE — 36415 COLL VENOUS BLD VENIPUNCTURE: CPT

## 2018-01-23 PROCEDURE — 4A1HXCZ MONITORING OF PRODUCTS OF CONCEPTION, CARDIAC RATE, EXTERNAL APPROACH: ICD-10-PCS | Performed by: OBSTETRICS & GYNECOLOGY

## 2018-01-23 RX ADMIN — DOCUSATE SODIUM SCH MG: 100 CAPSULE, LIQUID FILLED ORAL at 09:56

## 2018-01-23 RX ADMIN — FERROUS SULFATE TAB 325 MG (65 MG ELEMENTAL FE) SCH MG: 325 (65 FE) TAB at 17:59

## 2018-01-23 RX ADMIN — FERROUS SULFATE TAB 325 MG (65 MG ELEMENTAL FE) SCH MG: 325 (65 FE) TAB at 09:56

## 2018-01-23 RX ADMIN — Medication SCH CAP: at 09:56

## 2018-01-23 RX ADMIN — FAMOTIDINE SCH MG: 20 TABLET, FILM COATED ORAL at 09:56

## 2018-01-23 RX ADMIN — IBUPROFEN SCH MG: 800 TABLET, FILM COATED ORAL at 13:50

## 2018-01-23 RX ADMIN — DOCUSATE SODIUM SCH MG: 100 CAPSULE, LIQUID FILLED ORAL at 17:58

## 2018-01-23 RX ADMIN — IBUPROFEN SCH MG: 800 TABLET, FILM COATED ORAL at 21:39

## 2018-01-23 RX ADMIN — FAMOTIDINE SCH MG: 20 TABLET, FILM COATED ORAL at 21:38

## 2018-01-23 RX ADMIN — SENNOSIDES, DOCUSATE SODIUM SCH EACH: 50; 8.6 TABLET, FILM COATED ORAL at 09:56

## 2018-01-23 NOTE — DELIVERY SUMMARY
=================================================================

Del Sum A-C

=================================================================

Datetime Report Generated by CPN: 2018 08:42

   

   

=================================================================

DELIVERY PERSONNEL

=================================================================

   

DELIVERY PERSONNEL:  [id number]

Delivery Doctor::  Stacie Cherry MD

Labor and Delivery Nurse::  David Basurto RN

Labor and Delivery Nurse::  Jaki Santos RN

Scrub Tech/CNA:  Yudith Semar, CST

   

=================================================================

MATERNAL INFORMATION

=================================================================

   

Delivery Anesthesia:  Epidural

Medications After Delivery:  Pitocin Drip 20 Units/1000ml NSS

Estimated  Blood Loss (ml):  200

Maternal Complications:  None

Provider Comments:  mild dystocia relieved with McRobert's, suprapubic

   and delivery of posterior arm.  Nursery called to delivery room

   within 5 min of delivery for deep suction and evaluation of infant

   secondary to poor respiratory effort and pallor.  Infant taken to

   nursery by nursery staff.

   

=================================================================

LABOR SUMMARY

=================================================================

   

EDC:  2018 00:00

No. Babies in Womb:  1

 Attempted:  No

Labor Anesthesia:  Epidural

   

=================================================================

LABOR INFORMATION

=================================================================

   

Reason for Induction:  Not Applicable

Onset of Labor:  2018 23:16

Complete Dilatation:  2018 06:17

Cervical Ripening Agents:  Cytotec @ 800

Oxytocin:  N/A

Group B Beta Strep:  Positive

Antibiotics # of Doses:  2

Antibiotics Time of Last Dose:  303

Name of Antibiotic Given:  Penicillin

Steroids Given:  None

Reason Steroids Not Administered:  Not Applicable

   

=================================================================

MEMBRANES

=================================================================

   

Membranes Rupture Method:  Artificial

Rupture of Membranes:  2018 06:07

Length of Rupture (hr):  0.67

Amniotic Fluid Color:  Clear

Amniotic Fluid Amount:  Moderate

Amniotic Fluid Odor:  Normal

   

=================================================================

STAGES OF LABOR

=================================================================

   

Stage 1 hr:  7

Stage 1 min:  1

Stage 2 hr:  0

Stage 2 min:  30

Stage 3 hr:  0

Stage 3 min:  3

Total Time in Labor hr:  7

Total Time in Labor min:  34

   

=================================================================

VAGINAL DELIVERY

=================================================================

   

Episiotomy:  None

Laceration #1:  None

Laceration Extension #1:  N/A

Laceration Repair:  Not Applicable

Sponge Count Correct:  N/A

Sharps Count Correct:  N/A

   

=================================================================

CSECTION DELIVERY

=================================================================

   

Primary Indication:  N/A

Secondary Indication:  N/A

CSection Incidence:  N/A

Labor:  N/A

Elective:  N/A

   

=================================================================

BABY A INFORMATION

=================================================================

   

Infant Delivery Date/Time:  2018 06:47

Method of Delivery:  Vaginal

Born in Route :  No

:  N/A

Forceps:  N/A

Vacuum Extraction:  N/A

   

=================================================================

PRESENTATION/POSITION BABY A

=================================================================

   

Presentation:  Cephalic

Cephalic Presentation:  Vertex

Vertex Position:  Left Occipital Anterior

Breech Presentation:  N/A

   

=================================================================

PLACENTA INFORMATION BABY A

=================================================================

   

Placenta Delivery Time :  2018 06:50

Placenta Method of Delivery:  Spontaneous

Placenta Status:  Delivered

   

=================================================================

APGAR SCORES BABY A

=================================================================

   

Heart Rate 1 min:  >100 bpm

Resp Effort 1 min:  Slow, Irregular

Reflex Irritability 1 min:  Grimace

Muscle Tone 1 min:  Flaccid

Color 1 min:  Blue/Pale

Resuscitation Effort 1 min:  Tactile Stimulation

APGAR SCORE 1 MIN:  4

Resuscitation Effort 5 min:  Tactile Stimulation; Oxygen; PPV/NCPAP

   

=================================================================

INFANT INFORMATION BABY A

=================================================================

   

Gestational Age at Delivery:  38.4

Gestational Status:  Early Term- 37- 38.6 Weeks

Infant Outcome :  Liveborn

Infant Condition :  Stable

Infant Sex:  Female

   

=================================================================

IDENTIFICATION BABY A

=================================================================

   

Infant Verification Date/Time:  2018 06:54

ID Band Number:  [id number]

Mother's Name Verified:  Yes

Infant Medical Record Number:  [id number]

RN Verifying Infant:  Rosamariae RN

Additional Verifying Personnel:  Pete RN

   

=================================================================

WEIGHT/LENGTH BABY A

=================================================================

   

Infant Birthweight (gm):  3720

Infant Weight (lb):  8

Infant Weight (oz):  3

Infant Length (in):  21.50

Infant Length (cm):  54.61

   

=================================================================

CORD INFORMATION BABY A

=================================================================

   

No. Cord Vessels:  3

Nuchal Cord :  N/A

Cord Blood Taken:  Yes-For Eval (Mom's Blood Type - or O+)

Infant Suction:  Mouth; Nose; Pharynx

   

=================================================================

ASSESSMENT BABY A

=================================================================

   

Infant Complications:  Decreased Variability

Physical Findings at Delivery:  Extra Digit(s)

Physical Findings- Other:  Poor respiratory effort

Infant Respirations:  Nasal Flaring

Skin to Skin:  No

Skin to Skin Time (min):  0

Neonatologist/ALS Called :  No

Infant Care By:  HANY Santos RN and JOSH Abreu RN

Transferred To:  [facility]

   

=================================================================

BABY B INFORMATION

=================================================================

   

 :  N/A

   

=================================================================

SIGNATURES

=================================================================

   

Signature:  Electronically signed by Stacie Cherry MD ([facility]) on

   2018 at 06:58  with User ID: [email or web address]

## 2018-01-23 NOTE — ADMISSION PHYSICAL
=================================================================



=================================================================

Datetime Report Generated by CPN: 2018 09:12

   

   

=================================================================

CURRENT ADMISSION

=================================================================

   

Chief Complaint:  Uterine Contractions

Indication for Induction:  Not Applicable

Indication for Induction:  Term, Intrauterine Pregnancy; Active Labor

Admit Plan:  Admit to Unit; Initiate Labor Protocol

   

=================================================================

ALLERGIES

=================================================================

   

Medication Allergies:  No

Medication Allergies:  latex- hives (2018); walnut (2018)

Medication Allergies:  latex (2018); walnut (2018)

Medication Allergies:  latex (2018)

Medication Allergies:  latex (2017)

Medication Allergies:  No Known Allergies (2017)

Latex:  Latex Allergies

Food Allergies:  Walnuts

Environmental Allergies:  None

   

=================================================================

OBSTETRICAL HISTORY

=================================================================

   

EDC:  2018 00:00

:  3

Para:  2

Term:  2

:  0

SAB:  0

IAB:  0

Ectopic:  0

Livin

Cesareans:  0

VBACs:  0

Multiple Births:  0

Gestational Diabetes:  No

Rh Sensitization:  No

Incompetent Cervix:  No

JENNIFER:  No

Infertility:  No

ART Treatment:  No

Uterine Anomaly:  No

IUGR:  No

Hx Previous C/S:  No

Macrosomia:  No

Hx Loss/Stillborn:  No

PIH:  No

Hx  Death:  No

Placenta Previa/Abruption:  No

Depression/PP Depression:  Yes

PTL/PROM:  No

Post Partum Hemorrhage:  No

Current Pregnancy Procedures:  Ultrasound

Obstetrical History Comments:  G1 - , , Girl 39 weeks

   G2 - 2015, , Girl 41 weeks

   G3 - Current pregnancy

      

   

=================================================================

***SEE PRENATAL RECORDS***

=================================================================

   

Alcohol:  No

Marijuana :  No

Cocaine:  No

Other Illicit Drugs:  No

Cigarettes:  Former Smoker. [id number]

   

=================================================================

MEDICAL HISTORY

=================================================================

   

Diabetes:  No

Blood Transfusion:  No

Pulmonary Disease (Asthma, TB):  No

Breast Disease:  No

Hypertension:  No

Gyn Surgery:  No

Heart Disease:  No

Hosp/Surgery:  No

Autoimmune Disorder:  No

Anesthetic Complications:  No

Kidney Disease:  No

Abnormal Pap Smear:  No

Neuro/Epilepsy:  No

Psychiatric Disorders:  No

Other Medical Diseases:  No

Hepatitis/Liver Disease:  No

Significant Family History:  No

Varicosities/Phlebitis:  No

Trauma/Violence :  No

Thyroid Dysfunction:  No

Medical History Comments:  not taking zoloft

   

=================================================================

INFECTIOUS HISTORY

=================================================================

   

Gonorrhea:  No

Genital Herpes:  No

Chlamydia:  Yes

Tuberculosis:  No

Syphilis:  No

Hepatitis:  No

HIV/AIDS Exposure:  No

Rash or Viral Illness:  No

HPV:  No

Infectious History Comments:  Chlamydia 

   

=================================================================

PHYSICAL EXAM

=================================================================

   

General:  Normal

HEENT:  Normal

Neurologic:  Normal

Thyroid:  Normal

Heart:  Normal

Lungs:  Normal

Breast:  Normal

Back:  Normal

Abdomen:  Normal

Genitourinary Exam:  Normal

Extremities:  Normal

DTRs:  Normal

Pelvic Type:  Adequate

Vital Signs:  Reviewed; Within Normal Limits

   

=================================================================

VAGINAL EXAM

=================================================================

   

Dilatation:  7

Effacement:  90

Station:  -1

   

=================================================================

MEMBRANES

=================================================================

   

Pooling:  Negative

Membranes:  Intact

   

=================================================================

FETUS A

=================================================================

   

EGA:  38.4

Monitoring:  External US

FHR- Baseline:  155

Variability:  Minimal - Undetectable to <=5bpm

Accelerations:  Absent

Decelerations:  None

FHR Category:  Category II

Estimated Fetal Weight (gm):  3500

Fetal Presentation:  Vertex

   

=================================================================

PLANS FOR LABOR AND DELIVERY

=================================================================

   

Labor and Delivery:  None

Pain Management:  Epidural

Feeding Preference:  Breast

Benefit of Breast Feed Discussed:  Yes

Circumcision:  N/A

   

=================================================================

INFORMED CONSENT

=================================================================

   

Signature:  Electronically signed by Stacie Cherry MD ([email or web address]) on

   2018 at 04:58  with User ID: [email or web address]

## 2018-01-24 LAB
ERYTHROCYTE [DISTWIDTH] IN BLOOD BY AUTOMATED COUNT: 14.1 % (ref 11.5–14)
HCT VFR BLD CALC: 33.4 % (ref 36–47)
HGB BLD-MCNC: 11 G/DL (ref 12–15.5)
MCH RBC QN AUTO: 29.7 PG (ref 27–33.4)
MCHC RBC AUTO-ENTMCNC: 33 G/DL (ref 32–36)
MCV RBC AUTO: 90 FL (ref 80–97)
PLATELET # BLD: 124 10^3/UL (ref 150–450)
RBC # BLD AUTO: 3.71 10^6/UL (ref 3.72–5.28)
WBC # BLD AUTO: 14.8 10^3/UL (ref 4–10.5)

## 2018-01-24 RX ADMIN — DOCUSATE SODIUM SCH MG: 100 CAPSULE, LIQUID FILLED ORAL at 18:51

## 2018-01-24 RX ADMIN — Medication SCH CAP: at 09:17

## 2018-01-24 RX ADMIN — FERROUS SULFATE TAB 325 MG (65 MG ELEMENTAL FE) SCH MG: 325 (65 FE) TAB at 09:17

## 2018-01-24 RX ADMIN — SENNOSIDES, DOCUSATE SODIUM SCH EACH: 50; 8.6 TABLET, FILM COATED ORAL at 09:17

## 2018-01-24 RX ADMIN — FERROUS SULFATE TAB 325 MG (65 MG ELEMENTAL FE) SCH MG: 325 (65 FE) TAB at 18:51

## 2018-01-24 RX ADMIN — FAMOTIDINE SCH MG: 20 TABLET, FILM COATED ORAL at 21:35

## 2018-01-24 RX ADMIN — DOCUSATE SODIUM SCH MG: 100 CAPSULE, LIQUID FILLED ORAL at 09:17

## 2018-01-24 RX ADMIN — IBUPROFEN SCH MG: 800 TABLET, FILM COATED ORAL at 14:52

## 2018-01-24 RX ADMIN — FAMOTIDINE SCH MG: 20 TABLET, FILM COATED ORAL at 09:18

## 2018-01-24 RX ADMIN — IBUPROFEN SCH MG: 800 TABLET, FILM COATED ORAL at 21:36

## 2018-01-24 RX ADMIN — IBUPROFEN SCH MG: 800 TABLET, FILM COATED ORAL at 06:32

## 2018-01-24 NOTE — PDOC PROGRESS REPORT
Subjective-OB


Subjective: 


Post Delivery Day:1 








27 year old G3 now P3 s/p  ppd1. Ambulating and voiding without difficulty. 

Baby in NICU.  Denies any needs at this time 








Physical Exam (OB)


Vital Signs: 


 











Temp Pulse Resp BP Pulse Ox


 


 97.9 F   63   15   108/65   100 


 


 18 07:43  18 07:43  18 07:43  18 07:43  18 07:43








 Intake & Output











 18





 06:59 06:59 06:59


 


Intake Total   300


 


Balance   300


 


Weight 85.9 kg  














- General


General Appearance: Appears well


In distress: None





- PIH/Pre-Eclampsia


DTR's: 2 +


Clonus: Negative


Headache: Absent


Epigastric Pain: No


Visual Changes: No





- Episiotomy/Laceration


Site Condition: N/A





- Lochia


Lochia Amount: Scant < 10 ml


Lochia Color: Rubra/Red





- Abdomen


Description: Soft


Hernia Present: No


Fundal Description: Firm, Midline


Fundal Height: u/u - u/2





- Respiratory


Respiratory Status: No respiratory distress





- Extremities


Upper extremity: Normal inspection


Lower extremities: Normal inspection





- Neurological


Cognition: Normal


Orientation: AAOx4





- Psychological


Associated symptoms: Normal affect, Normal mood





Objective-Diagnostic


Laboratory: 


 





 18 07:26 





 











  18





  07:26


 


WBC  14.8 H


 


RBC  3.71 L


 


Hgb  11.0 L


 


Hct  33.4 L


 


MCV  90


 


MCH  29.7


 


MCHC  33.0


 


RDW  14.1 H


 


Plt Count  124 L














Assessment and Plan(PN)





- Assessment and Plan


(1) Vaginal delivery


Is this a current diagnosis for this admission?: Yes   


Plan: 


routine pp care








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


Is this a current diagnosis for this admission?: Yes   


Plan: 


received 2 doses of antibiotics in labor








- Time Spent with Patient


Time with patient: Less than 15 minutes


Medications reviewed and adjusted accordingly: Yes





- Disposition


Anticipated Discharge: Home


Within: within 24 hours

## 2018-01-25 VITALS — DIASTOLIC BLOOD PRESSURE: 74 MMHG | SYSTOLIC BLOOD PRESSURE: 97 MMHG

## 2018-01-25 RX ADMIN — FERROUS SULFATE TAB 325 MG (65 MG ELEMENTAL FE) SCH MG: 325 (65 FE) TAB at 10:01

## 2018-01-25 RX ADMIN — FAMOTIDINE SCH MG: 20 TABLET, FILM COATED ORAL at 10:02

## 2018-01-25 RX ADMIN — DOCUSATE SODIUM SCH MG: 100 CAPSULE, LIQUID FILLED ORAL at 10:01

## 2018-01-25 RX ADMIN — SENNOSIDES, DOCUSATE SODIUM SCH EACH: 50; 8.6 TABLET, FILM COATED ORAL at 10:01

## 2018-01-25 RX ADMIN — Medication SCH CAP: at 10:00

## 2018-01-25 RX ADMIN — IBUPROFEN SCH MG: 800 TABLET, FILM COATED ORAL at 05:19

## 2018-01-25 NOTE — PDOC DISCHARGE SUMMARY
Final Diagnosis


Discharge Date: 18





- Final Diagnosis


(1) GBS carrier


Is this a current diagnosis for this admission?: Yes   





(2) Pregnancy


Is this a current diagnosis for this admission?: Yes   





(3) Vaginal delivery


Is this a current diagnosis for this admission?: Yes   





Discharge Data





- Discharge Medication


Home Medications: 








No Home Medications  17 








Gestational Age: 38.4 wks


Reason(s) for Admission: Onset of Labor


Prenatal Procedures: Ultrasound


Intrapartum Procedure(s): Spontaneous Vaginal Delivery





-  Data


  ** Baby 1 Female


Apgar at 1 minute: 4


Weight: 3.714 kg


Home with Mother: No


Complications: Yes - Respiratory infection-on antibiotics





- Diagnosis Test


Laboratory: 


 











Temp Pulse Resp BP Pulse Ox


 


 97.9 F   79   16   97/74 L  100 


 


 18 07:41  18 07:41  18 07:41  18 07:41  18 07:41








 











  18





  22:10 23:45 07:26


 


RBC   4.23  3.71 L


 


Hgb   12.6  11.0 L


 


Hct   37.9  33.4 L


 


Urine Opiates Screen  NEGATIVE  














- Discharge information/Instructions


Discharge Activity: Activity As Tolerated, Balance Activity w/Rest, Pelvic Rest

, Slowly Increase Activity, No tub bath


Discharge Diet: Regular


Disposition: HOME, SELF-CARE


Follow up with: [facility]


in: 4, Weeks

## 2018-01-25 NOTE — PDOC PROGRESS REPORT
Subjective-OB


Subjective: 


Post Delivery Day:








27 year old.  Denies any needs at this time. Ready for discharge. 








Physical Exam (OB)


Vital Signs: 


 











Temp Pulse Resp BP Pulse Ox


 


 98.1 F   80   18   119/71   100 


 


 01/24/18 21:11  01/24/18 21:11  01/24/18 21:11  01/24/18 21:11  01/24/18 21:11








 Intake & Output











 01/24/18 01/25/18 01/26/18





 06:59 06:59 06:59


 


Intake Total  300 


 


Balance  300 














- PIH/Pre-Eclampsia


DTR's: 2 +


Clonus: Negative


Headache: Absent


Epigastric Pain: No


Visual Changes: No





- Lochia


Lochia Amount: Scant < 10 ml


Lochia Color: Rubra/Red





- Abdomen


Description: Soft, Round


Hernia Present: No


Bowel Sounds: Normoactive


Flatus Presence: Present


Stool: Yes


Fundal Description: Firm


Fundal Height: u/u - u/2





Objective-Diagnostic


Laboratory: 


 





 01/24/18 07:26 











Assessment and Plan(PN)





- Time Spent with Patient


Medications reviewed and adjusted accordingly: Yes





- Disposition


Anticipated Discharge: Home

## 2018-05-29 ENCOUNTER — HOSPITAL ENCOUNTER (EMERGENCY)
Dept: HOSPITAL 62 - ER | Age: 28
Discharge: HOME | End: 2018-05-29
Payer: COMMERCIAL

## 2018-05-29 VITALS — SYSTOLIC BLOOD PRESSURE: 130 MMHG | DIASTOLIC BLOOD PRESSURE: 66 MMHG

## 2018-05-29 DIAGNOSIS — K59.00: Primary | ICD-10-CM

## 2018-05-29 DIAGNOSIS — K62.89: ICD-10-CM

## 2018-05-29 PROCEDURE — 99283 EMERGENCY DEPT VISIT LOW MDM: CPT

## 2018-05-29 NOTE — ER DOCUMENT REPORT
HPI





- HPI


Pain Level: 5


Context: 





Patient is a 28-year-old female presents emergency department the chief 

complaint of rectal pain.  Patient states that she had her tonsils out on May 

18 and has been taking hydrocodone for pain.  Patient states that she has been 

taking Colace but has not had a bowel movement for the past 10 days.  Patient 

states that she now has rectal pain whenever she tries to go but she denies any 

bleeding.  She states that she is concerned she is a hemorrhoid.  She states 

that her discomfort is at the 3 o'clock position of her anus.  She denies any 

active bleeding.  She has not taken anything over-the-counter for constipation





- REPRODUCTIVE


Reproductive: DENIES: Pregnant:





Past Medical History





- Social History


Smoking Status: Unknown if Ever Smoked


Family History: Reviewed & Not Pertinent


Patient has suicidal ideation: No


Patient has homicidal ideation: No


Renal/ Medical History: Denies: Hx Peritoneal Dialysis


Psychiatric Medical History: Reports: Hx Attention Deficit Hyperactivity 

Disorder


Traumatic Medical History: Reports: Hx Fractures


Past Surgical History: Reports: Hx Oral Surgery





- Immunizations


Hx Diphtheria, Pertussis, Tetanus Vaccination: Yes





Vertical Provider Document





- CONSTITUTIONAL


Agree With Documented VS: Yes


Notes: 





PHYSICAL EXAM


GENERAL: Alert, interacts well. 


HEAD: Normocephalic, atraumatic.


EYES: Pupils equal, round, and reactive to light. Extraocular movements intact.


ENT: Oral mucosa moist, tongue midline. 


NECK: Full range of motion. Supple. Trachea midline.


ABDOMEN: Soft,  nondistended, nontender. No guarding, rebound, or rigidity.. 

Bowel sounds present in all 4 quadrants.  Rectal: Normal inspection without 

evidence of external hemorrhoids.  No evidence of external fissure.  Focal 

tenderness at 3:00 without any fluctuance and no internal pain to palpation.


NEUROLOGICAL: Alert and oriented x4. Normal speech.


PSYCH: Normal affect, normal mood.


SKIN: Warm, dry, normal turgor. No rashes or lesions noted.








- INFECTION CONTROL


TRAVEL OUTSIDE OF THE [place] IN LAST 30 DAYS: No





Course





- Re-evaluation


Re-evalutation: 





05/29/18 18:52


Patient is a 28-year-old female who presents emergency room with chief 

complaint of constipation.  Patient is in no acute distress, hemodynamically 

stable, and afebrile.  Patient's belly exam benign without any concerns for 

peritonitis.  Soft without any focal tenderness.  No concerns for obstruction 

given patient tolerating p.o. without any difficulty no evidence of distention.

  Educated patient for over-the-counter management of constipation and will 

send home with topical jelly taking for rectal discomfort.  Given strict return 

precautions and stable for discharge





- Vital Signs


Vital signs: 


 











Temp Pulse Resp BP Pulse Ox


 


 99.0 F   109 H  16   130/66 H  96 


 


 05/29/18 18:00  05/29/18 18:00  05/29/18 18:00  05/29/18 18:00  05/29/18 18:00














Discharge





- Discharge


Clinical Impression: 


Constipation


Qualifiers:


 Constipation type: unspecified constipation type Qualified Code(s): K59.00 - 

Constipation, unspecified





Condition: Good


Disposition: HOME, SELF-CARE


Additional Instructions: 


ABDOMINAL PAIN:





     There are many causes of abdominal pain.  Pain can mean a serious problem 

requiring surgery (such as appendicitis). It can also be an innocent problem 

that goes away on its own (such as a viral infection).  Often, time must pass 

to determine the cause of pain.


     The physician does not feel that hospitalization is necessary, at present. 

Things may change within the next 24 hours. Call the doctor or come back for re-

examination if any problems occur, such as:


     (1) Pain that becomes more severe, steady, or becomes concentrated in one 

specific area.  Also, pain that is more severe with movement or coughing.  


     (2) Vomiting that persists or becomes more frequent.  


     (3) Blood in the vomitus, urine, or bowel movements.  Blood in the stool 

may have a tarry or black appearance.


     (4) Shaking chills or fever greater than 100 degrees F. 


     (5) The abdomen becomes more distended or swollen. 


     (6) Bowel movements cease. 


     (7) Failure to improve as expected.








NORMAL EXAM AND WORKUP:


     At this time, your examination and workup show no significant abnormality.

  No significant abnormal physical findings are noted.  All laboratory, EKG, 

and imaging (x-ray, CT scans, ultrasound) studies that were ordered show no 

significant abnormality.


     Although your examination and all studies that were ordered showed no 

significant abnormal finding, there are no examinations and no studies that are 

100% accurate.  There is always the possibility that some abnormality could 

exist and not be detected with physical examination or within the limits and 

capabilities of laboratory and other studies.


     You should return or follow up as you were instructed on your visit today 

for further evaluation if your symptoms do not resolve.








CONSTIPATION:





     Constipation is a common problem.  It is especially likely as you get 

older.  Constipation is a common cause of abdominal pain, but sometimes causes 

no symptoms at all.  Causes of constipation include certain medications, 

dehydration, diets, inactivity, and low-fiber intake.  Rarely, it can be a 

symptom of underlying disease.  The physician has evaluated you for this.


     Avoid constipation by eating a diet high in fiber, fruits, and vegetables.

  Drink plenty of liquids.  Get regular exercise.  If possible, avoid 

constipating medicines like narcotic pain medication.  Some vitamin tablets can 

cause constipation.  Stool softeners may be needed for difficult cases.  An 

excellent stool softener is Konsyl which is available at [employer], and 

[employer].  Just add a teaspoon to a glass of pineapple or orange 

juice daily or twice a day if needed.


   Laxatives are useful for occasional constipation.  You should use them only 

when necessary.  Too-frequent use can make your bowels dependent on them.  Some 

over the counter laxatives available without prescription are:





   Milk of Magnesia, 1-2 tablespoons twice a day


    Dulcolax, 5 mg pill or 10 mg suppository.


   Citrate of Magnesia, 4-5 ounces a day for a day or two





For acute constipation, Fleet's Enemas and Dulcolax suppositories are helpful.


     Chronic, long term  use of laxatives or enemas is not a good idea.  Your 

bowel may become dependant on them.  You do not need to have a bowel movement 

every day.  Many people do fine with a bowel movement every three or four days.


     You should call your doctor or return for re-evaluation if you pass blood 

in the stool, or if you develop fever or increasing abdominal pain.








BULK LAXATIVES: 


     Bulk laxatives make the stool softer and bulkier.  They're useful for 

preventing constipation.  You can choose between psyllium, methylcellulose, and 

polycarbophil.  They are available without a prescription.


     Psyllium brand names include Konsyl, Metamucil, Perdiem, Effer-Syllium and 

Hydrocil.  It's available as powder, flavored drink powder, or chewable. The 

usual dose of psyllium powder is one heaping teaspoon in water each morning, 

increasing to twice a day if needed.  Orange juice can disguise the slightly 

grainy texture.


     Methylcellulose is marketed as Citrucel and other brands.  The average 

dose is two grams in a cup of water one to three times a day.


     Polycarbophil is marketed as Fiber-Con.  Take two tablets with a cup of 

water one to three times a day.








LAXATIVE:


     A laxative agent has been prescribed for your condition.  This should 

result in passage of stool within 12 hours.


     Some mild intestinal cramping is common as the hard stool begins to move.  

You may have loose or runny stools for a short time.


     Contact your doctor if there is severe cramping, vomiting, or passage of 

blood.  Return for further care if this medicine fails to improve your 

condition.








FOLLOW-UP CARE:


If you have been referred to a physician for follow-up care, call the physician

s office for an appointment as you were instructed or within the next two days.

  If you experience worsening or a significant change in your symptoms, notify 

the physician immediately or return to the Emergency Department at any time for 

re-evaluation.


Prescriptions: 


Lidocaine [Topicaine 5] 30 gm TP Q4HP PRN #1 gel..gram.


 PRN Reason: 


Referrals: 


LANE RODRIGUEZ MD [ACTIVE STAFF] - Follow up as needed

## 2018-10-05 ENCOUNTER — HOSPITAL ENCOUNTER (EMERGENCY)
Dept: HOSPITAL 62 - ER | Age: 28
Discharge: HOME | End: 2018-10-05
Payer: COMMERCIAL

## 2018-10-05 VITALS — SYSTOLIC BLOOD PRESSURE: 132 MMHG | DIASTOLIC BLOOD PRESSURE: 78 MMHG

## 2018-10-05 DIAGNOSIS — R06.02: ICD-10-CM

## 2018-10-05 DIAGNOSIS — Z86.79: ICD-10-CM

## 2018-10-05 DIAGNOSIS — R00.2: ICD-10-CM

## 2018-10-05 DIAGNOSIS — F17.210: ICD-10-CM

## 2018-10-05 DIAGNOSIS — Z91.040: ICD-10-CM

## 2018-10-05 DIAGNOSIS — R07.89: ICD-10-CM

## 2018-10-05 DIAGNOSIS — Z91.018: ICD-10-CM

## 2018-10-05 DIAGNOSIS — Z79.3: ICD-10-CM

## 2018-10-05 DIAGNOSIS — F41.9: Primary | ICD-10-CM

## 2018-10-05 LAB
ADD MANUAL DIFF: NO
ALBUMIN SERPL-MCNC: 4.3 G/DL (ref 3.5–5)
ALP SERPL-CCNC: 75 U/L (ref 38–126)
ALT SERPL-CCNC: 48 U/L (ref 9–52)
ANION GAP SERPL CALC-SCNC: 11 MMOL/L (ref 5–19)
AST SERPL-CCNC: 28 U/L (ref 14–36)
BASOPHILS # BLD AUTO: 0 10^3/UL (ref 0–0.2)
BASOPHILS NFR BLD AUTO: 0.5 % (ref 0–2)
BILIRUB DIRECT SERPL-MCNC: 0.3 MG/DL (ref 0–0.4)
BILIRUB SERPL-MCNC: 0.6 MG/DL (ref 0.2–1.3)
BUN SERPL-MCNC: 8 MG/DL (ref 7–20)
CALCIUM: 9.8 MG/DL (ref 8.4–10.2)
CHLORIDE SERPL-SCNC: 107 MMOL/L (ref 98–107)
CK MB SERPL-MCNC: 1.39 NG/ML (ref ?–4.55)
CK SERPL-CCNC: 498 U/L (ref 30–135)
CO2 SERPL-SCNC: 22 MMOL/L (ref 22–30)
EOSINOPHIL # BLD AUTO: 0.2 10^3/UL (ref 0–0.6)
EOSINOPHIL NFR BLD AUTO: 2.3 % (ref 0–6)
ERYTHROCYTE [DISTWIDTH] IN BLOOD BY AUTOMATED COUNT: 14.4 % (ref 11.5–14)
GLUCOSE SERPL-MCNC: 93 MG/DL (ref 75–110)
HCT VFR BLD CALC: 41.8 % (ref 36–47)
HGB BLD-MCNC: 14.6 G/DL (ref 12–15.5)
LYMPHOCYTES # BLD AUTO: 1.6 10^3/UL (ref 0.5–4.7)
LYMPHOCYTES NFR BLD AUTO: 20.5 % (ref 13–45)
MCH RBC QN AUTO: 31.4 PG (ref 27–33.4)
MCHC RBC AUTO-ENTMCNC: 34.8 G/DL (ref 32–36)
MCV RBC AUTO: 90 FL (ref 80–97)
MONOCYTES # BLD AUTO: 0.4 10^3/UL (ref 0.1–1.4)
MONOCYTES NFR BLD AUTO: 5.5 % (ref 3–13)
NEUTROPHILS # BLD AUTO: 5.6 10^3/UL (ref 1.7–8.2)
NEUTS SEG NFR BLD AUTO: 71.2 % (ref 42–78)
PLATELET # BLD: 203 10^3/UL (ref 150–450)
POTASSIUM SERPL-SCNC: 4.3 MMOL/L (ref 3.6–5)
PROT SERPL-MCNC: 7.4 G/DL (ref 6.3–8.2)
RBC # BLD AUTO: 4.63 10^6/UL (ref 3.72–5.28)
SODIUM SERPL-SCNC: 140.2 MMOL/L (ref 137–145)
TOTAL CELLS COUNTED % (AUTO): 100 %
TROPONIN I SERPL-MCNC: < 0.012 NG/ML
WBC # BLD AUTO: 7.9 10^3/UL (ref 4–10.5)

## 2018-10-05 PROCEDURE — 85379 FIBRIN DEGRADATION QUANT: CPT

## 2018-10-05 PROCEDURE — 82550 ASSAY OF CK (CPK): CPT

## 2018-10-05 PROCEDURE — 84484 ASSAY OF TROPONIN QUANT: CPT

## 2018-10-05 PROCEDURE — 80053 COMPREHEN METABOLIC PANEL: CPT

## 2018-10-05 PROCEDURE — 99285 EMERGENCY DEPT VISIT HI MDM: CPT

## 2018-10-05 PROCEDURE — 82553 CREATINE MB FRACTION: CPT

## 2018-10-05 PROCEDURE — 71275 CT ANGIOGRAPHY CHEST: CPT

## 2018-10-05 PROCEDURE — 84703 CHORIONIC GONADOTROPIN ASSAY: CPT

## 2018-10-05 PROCEDURE — 83735 ASSAY OF MAGNESIUM: CPT

## 2018-10-05 PROCEDURE — 85025 COMPLETE CBC W/AUTO DIFF WBC: CPT

## 2018-10-05 PROCEDURE — 93005 ELECTROCARDIOGRAM TRACING: CPT

## 2018-10-05 PROCEDURE — 84443 ASSAY THYROID STIM HORMONE: CPT

## 2018-10-05 PROCEDURE — 71046 X-RAY EXAM CHEST 2 VIEWS: CPT

## 2018-10-05 PROCEDURE — 93010 ELECTROCARDIOGRAM REPORT: CPT

## 2018-10-05 PROCEDURE — 36415 COLL VENOUS BLD VENIPUNCTURE: CPT

## 2018-10-05 NOTE — RADIOLOGY REPORT (SQ)
EXAM DESCRIPTION:  CHEST 2 VIEWS



COMPLETED DATE/TIME:  10/5/2018 2:33 pm



REASON FOR STUDY:  chest pain, palpitations



COMPARISON:  11/18/2011



EXAM PARAMETERS:  NUMBER OF VIEWS: two views

TECHNIQUE: Digital Frontal and Lateral radiographic views of the chest acquired.

RADIATION DOSE: NA

LIMITATIONS: none



FINDINGS:  LUNGS AND PLEURA: No opacities, masses or pneumothorax. No pleural effusion.

MEDIASTINUM AND HILAR STRUCTURES: No masses or contour abnormalities.

HEART AND VASCULAR STRUCTURES: Heart normal size.  No evidence for failure.

BONES: No acute findings.

HARDWARE: None in the chest.

OTHER: No other significant finding.



IMPRESSION:  NO ACUTE RADIOGRAPHIC FINDING IN THE CHEST.



TECHNICAL DOCUMENTATION:  JOB ID:  [id number]

 2011 [employer]- All Rights Reserved



Reading location - IP/workstation name: [email or web address]

## 2018-10-05 NOTE — ER DOCUMENT REPORT
ED Cardiac





- General


Chief Complaint: Irregular Pulse


Stated Complaint: IRREGULAR HEART BEAT,SHORT OF BREATH


Time Seen by Provider: 10/05/18 13:15


Mode of Arrival: Ambulatory


Information source: Patient


Notes: 





28-year-old female patient comes emergency room complaining of her heart racing 

and irregular since last night.  She reports it occurred all night long causing 

chest pressure and shortness of breath.  She states she had atrial fibrillation 

when she was 19 years old, and is on no medications.  She did see a local 

cardiologist, she does not know the doctor's name or the location of his office 

to help narrow it down.  She reports that her heart is still fluttering and 

beating irregular and fast at this time.


An EKG done shows a normal sinus rhythm.


She does admit to anxiety problems, states it is usually when she is driving.


He also reports that she had thyroid problems in the past.  She cannot 

elaborate on what this means.


TRAVEL OUTSIDE OF THE [place] IN LAST 30 DAYS: No





- Related Data


Allergies/Adverse Reactions: 


 





latex Allergy (Verified 01/22/18 22:08)


 


walnut Allergy (Verified 01/22/18 22:08)


 











Past Medical History





- General


Information source: Patient





- Social History


Smoking Status: Current Every Day Smoker


Cigarette use (# per day): Yes - 1 PPD


Chew tobacco use (# tins/day): No


Smoking Education Provided: No


Frequency of alcohol use: Occasional


Drug Abuse: None


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


Lives with: Family


Family History: Reviewed & Not Pertinent


Patient has suicidal ideation: No


Patient has homicidal ideation: No





- Past Medical History


Cardiac Medical History: Reports: Hx Atrial Fibrillation


Psychiatric Medical History: Reports: Hx Anxiety, Hx Attention Deficit 

Hyperactivity Disorder


Traumatic Medical History: Reports: Hx Fractures


Past Surgical History: Reports: Hx Oral Surgery - Third molars were removed, Hx 

Tonsillectomy





- Immunizations


Hx Diphtheria, Pertussis, Tetanus Vaccination: Yes





Review of Systems





- Review of Systems


Constitutional: No symptoms reported


EENT: No symptoms reported


Cardiovascular: No symptoms reported


Respiratory: No symptoms reported


Gastrointestinal: No symptoms reported


Genitourinary: No symptoms reported


Female Genitourinary: Last menstrual period - Patient is on the Depo shot


Musculoskeletal: No symptoms reported


Skin: No symptoms reported


Hematologic/Lymphatic: No symptoms reported


Neurological/Psychological: Anxiety





Physical Exam





- Vital signs


Vitals: 


 











Temp Pulse Resp BP Pulse Ox


 


 98.6 F   86   20   125/66   98 


 


 10/05/18 13:08  10/05/18 13:08  10/05/18 13:08  10/05/18 13:08  10/05/18 13:08











Interpretation: Normal





- General


General appearance: Appears well, Alert


In distress: None





- HEENT


Head: Normocephalic, Atraumatic


Eyes: Normal


Pupils: PERRL





- Respiratory


Respiratory status: No respiratory distress


Breath sounds: Normal





- Cardiovascular


Rhythm: Regular.  No: Extrasystoles


Heart sounds: Normal auscultation


Murmur: No





- Abdominal


Inspection: Normal





- Back


Back: Normal





- Extremities


General upper extremity: Normal inspection


General lower extremity: Normal inspection





- Neurological


Neuro grossly intact: Yes





- Psychological


Associated symptoms: Normal affect, Normal mood, Anxious - Patient does seem a 

little bit anxious





Course





- Re-evaluation


Re-evalutation: 





10/05/18 15:07


The EKG and physical exam did not show any cardiac arrhythmia.  The EKG does 

show normal sinus rhythm.  The patient did reports she was feeling the symptoms 

of irregular and fast heart rate during the EKG and during the physical exam.  

It is unlikely there was an arrhythmia that had resolved prior to evaluation 

given that the symptoms were present during the testing.  Patient does admit to 

anxiety problems.  She does have a d-dimer which is the upper limits of normal, 

and is on the Depakote shot, and is a smoker.  





- Vital Signs


Vital signs: 


 











Temp Pulse Resp BP Pulse Ox


 


 98.6 F   86   20   125/66   98 


 


 10/05/18 13:08  10/05/18 13:08  10/05/18 13:08  10/05/18 13:08  10/05/18 13:08














- Laboratory


Result Diagrams: 


 10/05/18 13:30





 10/05/18 13:30


Laboratory results interpreted by me: 


 











  10/05/18 10/05/18





  13:30 13:30


 


RDW  14.4 H 


 


Magnesium   2.4 H


 


Creatine Kinase   498 H














- Diagnostic Test


Radiology reviewed: Image reviewed, Reports reviewed - Chest x-ray is normal.  

CTA chest is normal.





- EKG Interpretation by Me


EKG shows normal: Sinus rhythm, Axis, Intervals, QRS Complexes.  abnormal: ST-T 

Waves -  Diffuse T abnormalities


Rate: Normal - 93


Rhythm: NSR





Discharge





- Discharge


Clinical Impression: 


 Palpitations, Anxiety, Shortness of breath





Condition: Stable


Disposition: HOME, SELF-CARE


Additional Instructions: 


Palpitations (Irregular/Rapid Heartrate)





     Irregular or rapid heartbeat is called "palpitation."  To diagnose the 

cause of palpitation, we have to "catch it in the act" with an EKG.


     Sinus Tachycardia:  This is a rapid (but NORMAL) rhythm that can be due to 

fever, pain, anxiety, lack of sleep, over-exertion, or drugs. Cold medications, 

caffeine, and diet pills are particularly likely to cause tachycardia.  Usually

, all that's required is rest, reassurance, and avoiding caffeine, alcohol, 

nicotine, and unnecessary medicines.


     Paroxysmal Atrial Tachycardia (PAT):  This abnormally rapid heartbeat is 

caused by a "short circuit" in the electrical system of the heart.  It is not 

dangerous, unless other heart disease is present. These attacks of PAT may 

occur occasionally for years.  Medication is available for treatment.


     Paroxysmal Atrial Fibrillation or Atrial Flutter:  This is irregular 

electrical activity in the upper heart chamber.  These abnormal rhythms often 

occur with valve disease or in hearts damaged by hardening of the arteries.  

These rhythms usually require further testing, for example a cardiac echo.


     Premature Beats:  Extra beats occur more commonly after caffeine, nicotine

, alcohol, cold pills, diet pills.  Emotional stress or fatigue also provoke 

them.  Extra beats are only dangerous when heart disease is present.  They 

usually need no treatment.  If they're frequent, or if evidence of heart 

disease develops, medication can be given to suppress them.


     If we were unable to "catch" the palpitations on EKG, you should try to 

get an EKG immediately if the symptoms begin again.  Contact the physician at 

once if you develop persistent lightheadedness, shortness of breath, chest pain

, or swelling of the ankles.





Anxiety





     The physician feels that some of your health problems are being caused by 

anxiety.  Anxiety affects your health in many ways.  Anxiety alone can cause 

palpitations, sweats, chest pains, abdominal pains, shortness of breath, and 

headaches.  It contributes to ulcer disease, high blood pressure, irritable 

bowel syndrome, and has been shown to cause flare-ups of many other diseases.


     Anxiety is not a simple disorder to treat.  If the anxiety is due to 

recent life stresses, you may simply need time to "work through" the changes.  

If the anxiety is due to an underlying unhappiness with yourself or due to 

psychiatric disturbance, professional help will be needed.  Your physician can 

refer you for further help if needed.


     Anti-anxiety medication is occasionally given if the stress is acute or if 

you are having trouble sleeping.  Chronic or frequent use of these medications 

is not a good idea because the body becomes reliant on it, preventing you from 

dealing with life's normal stresses.








********************************************************************************

************************************





Your EKG today and physical exam did not show any fast or irregular heartbeat.


A scan of your lungs did not show any blood clots or other explanation for your 

feeling short of breath.


The symptoms you are having may well be due to anxiety.


You should drink plenty of fluids today and get plenty of rest.


Follow-up with a local medical doctor if not improving.





RETURN TO THE EMERGENCY ROOM IF ANY NEW OR WORSENING SYMPTOMS.








Forms:  Return to Work


Referrals: 


FARRUKH CARD MD [Primary Care Provider] - Follow up as needed

## 2018-10-05 NOTE — RADIOLOGY REPORT (SQ)
EXAM DESCRIPTION:  CTA CHEST



COMPLETED DATE/TIME:  10/5/2018 3:49 pm



REASON FOR STUDY:  Palpitations, SOB, Depakote shot and smoker,



COMPARISON:  None.



TECHNIQUE:  CT scan of the chest performed using helical scanning technique with dynamic intravenous 
contrast injection.  Images reviewed with lung, soft tissue and bone windows.  Reconstructed coronal 
and sagittal MPR images reviewed.

Additional 3 dimensional post-processing performed to develop Maximal Intensity Projection images (MI
P).  All images stored on PACS.

All CT scanners at this facility use dose modulation, iterative reconstruction, and/or weight based d
osing when appropriate to reduce radiation dose to as low as reasonably achievable (ALARA).

[facility]: Dose Right  [facility]: CareDose    [facility]: Dose Right    [facility]: Teradose 4D    [facility]: Smart Technologies



CONTRAST TYPE AND DOSE:  contrast/concentration: Isovue 350.00 mg/ml; Total Contrast Delivered: 72.0 
ml; Total Saline Delivered: 90.0 ml

Contrast bolus optimized for the pulmonary arteries. Not diagnostic for the aorta.



RENAL FUNCTION:  GFR > 60.



RADIATION DOSE:  CT Rad equipment meets quality standard of care and radiation dose reduction techniq
ues were employed. CTDIvol: 3.3 - 16.5 mGy. DLP: 569 mGy-cm. .



LIMITATIONS:  None.



FINDINGS:  LUNGS AND PLEURA: No masses, infiltrates, or pneumothorax.  No pleural effusions or pleura
l calcifications.

AORTA AND GREAT VESSELS: No aneurysm.  Contrast bolus not optimized for the aorta.

HEART: No pericardial effusion. No significant coronary artery calcifications.

PULMONARY ARTERIES: No emboli visualized in the main pulmonary arteries or the segmental branches.

HILAR AND MEDIASTINAL STRUCTURES: No identified masses or abnormal nodes.

HARDWARE: None in the chest.

UPPER ABDOMEN: No significant findings.  Limited exam.

THYROID AND OTHER SOFT TISSUES: No masses.  No adenopathy.

BONES: No acute or significant finding.

3D MIPS: Confirm above findings.

OTHER: No other significant finding.



IMPRESSION:  NORMAL CTA OF THE CHEST. NO PULMONARY EMBOLI.



COMMENT:  Quality ID # [id number]: Final reports with documentation of one or more dose reduction techniques
 (e.g., Automated exposure control, adjustment of the mA and/or kV according to patient size, use of 
iterative reconstruction technique)



TECHNICAL DOCUMENTATION:  JOB ID:  [id number]

 2011 [employer]- All Rights Reserved



Reading location - IP/workstation name: [facility]

## 2018-10-06 NOTE — EKG REPORT
SEVERITY:- BORDERLINE ECG -

SINUS RHYTHM

BORDERLINE T ABNORMALITIES, DIFFUSE LEADS

:

Confirmed by: Saranya Montanez 06-Oct-2018 19:37:55

## 2018-10-30 ENCOUNTER — HOSPITAL ENCOUNTER (OUTPATIENT)
Dept: HOSPITAL 62 - SP | Age: 28
End: 2018-10-30
Attending: FAMILY MEDICINE
Payer: COMMERCIAL

## 2018-10-30 DIAGNOSIS — M79.604: Primary | ICD-10-CM

## 2018-10-30 PROCEDURE — 93970 EXTREMITY STUDY: CPT

## 2018-10-30 NOTE — RADIOLOGY REPORT (SQ)
EXAM DESCRIPTION:  VENOUS BILATERAL LOWER



COMPLETED DATE/TIME:  10/30/2018 3:59 pm



REASON FOR STUDY:  PAIN IN RIGHT LEG M79.604  PAIN IN RIGHT LEG



COMPARISON:  None.



TECHNIQUE:  Dynamic and static gray scale and color images acquired of both lower extremity venous sy
stems. Selected spectral images acquired with additional compression and augmentation maneuvers. Imag
es stored on PACS.



LIMITATIONS:  None.



FINDINGS:  RIGHT LEG

COMMON FEMORAL AND FEMORAL: Normal phasicity, compression and augmentation. No visualized echogenic m
aterial on gray scale. No defects on color images.

POPLITEAL: Normal compression and augmentation. No visualized echogenic material on gray scale. No de
fects on color images.

CALF VESSELS: Normal compression and augmentation. No visualized echogenic material on gray scale. No
 defects on color image.

GSV AND SSV: Normal compression. No visualized echogenic material on gray scale. No defects on color 
images.

ANY DEEP VENOUS INSUFFICIENCY: No.

ANY EVIDENCE OF POPLITEAL CYST: No.

OTHER: No other significant finding.

LEFT LEG

COMMON FEMORAL AND FEMORAL: Normal phasicity, compression and augmentation. No visualized echogenic m
aterial on gray scale. No defects on color images.

POPLITEAL: Normal compression and augmentation. No visualized echogenic material on gray scale. No de
fects on color images.

CALF VESSELS: Normal compression and augmentation. No visualized echogenic material on gray scale. No
 defects on color images.

GSV AND SSV: Normal compression. No visualized echogenic material on gray scale. No defects on color 
images.

ANY DEEP VENOUS INSUFFICIENCY: No.

ANY EVIDENCE POPLITEAL CYST: No.

OTHER: No other significant finding.



IMPRESSION:  NO EVIDENCE DVT OR SVT IN EITHER LEG.



TECHNICAL DOCUMENTATION:  JOB ID:  [id number]

 2011 [employer]- All Rights Reserved



Reading location - IP/workstation name: [email or web address]

## 2019-01-28 ENCOUNTER — OCC HEALTH (OUTPATIENT)
Dept: OTHER | Facility: HOSPITAL | Age: 29
End: 2019-01-28
Attending: PREVENTIVE MEDICINE

## 2019-01-28 DIAGNOSIS — Z01.84 IMMUNITY STATUS TESTING: Primary | ICD-10-CM

## 2019-01-28 LAB
MEV IGG SER-ACNC: >300 AU/ML (ref 30–?)
MUV IGG SER IA-ACNC: 140 AU/ML (ref 11–?)
RUBV IGG SER QL: POSITIVE
RUBV IGG SER-ACNC: 37.4 IU/ML (ref 10–?)
VZV IGG SER IA-ACNC: 801.2 (ref 165–?)

## 2019-01-28 PROCEDURE — 86735 MUMPS ANTIBODY: CPT

## 2019-01-28 PROCEDURE — 86765 RUBEOLA ANTIBODY: CPT

## 2019-01-28 PROCEDURE — 86787 VARICELLA-ZOSTER ANTIBODY: CPT

## 2019-01-28 PROCEDURE — 86762 RUBELLA ANTIBODY: CPT

## 2019-02-05 ENCOUNTER — APPOINTMENT (OUTPATIENT)
Dept: OTHER | Facility: HOSPITAL | Age: 29
End: 2019-02-05
Attending: PREVENTIVE MEDICINE

## 2019-02-07 ENCOUNTER — APPOINTMENT (OUTPATIENT)
Dept: OTHER | Facility: HOSPITAL | Age: 29
End: 2019-02-07
Attending: PREVENTIVE MEDICINE

## 2019-02-11 ENCOUNTER — HOSPITAL ENCOUNTER (EMERGENCY)
Facility: HOSPITAL | Age: 29
Discharge: HOME | End: 2019-02-11
Attending: EMERGENCY MEDICINE
Payer: OTHER MISCELLANEOUS

## 2019-02-11 VITALS
WEIGHT: 185 LBS | DIASTOLIC BLOOD PRESSURE: 92 MMHG | HEIGHT: 65 IN | OXYGEN SATURATION: 98 % | HEART RATE: 84 BPM | SYSTOLIC BLOOD PRESSURE: 123 MMHG | TEMPERATURE: 99 F | RESPIRATION RATE: 18 BRPM | BODY MASS INDEX: 30.82 KG/M2

## 2019-02-11 DIAGNOSIS — T22.211A PARTIAL THICKNESS BURN OF RIGHT FOREARM, INITIAL ENCOUNTER: Primary | ICD-10-CM

## 2019-02-11 PROCEDURE — 16020 DRESS/DEBRID P-THICK BURN S: CPT

## 2019-02-11 PROCEDURE — 99283 EMERGENCY DEPT VISIT LOW MDM: CPT

## 2019-02-11 RX ORDER — HYDROCODONE BITARTRATE AND ACETAMINOPHEN 5; 325 MG/1; MG/1
2 TABLET ORAL ONCE
Status: COMPLETED | OUTPATIENT
Start: 2019-02-11 | End: 2019-02-11

## 2019-02-11 RX ORDER — HYDROCODONE BITARTRATE AND ACETAMINOPHEN 5; 325 MG/1; MG/1
1-2 TABLET ORAL EVERY 6 HOURS PRN
Qty: 20 TABLET | Refills: 0 | Status: SHIPPED | OUTPATIENT
Start: 2019-02-11 | End: 2019-02-21

## 2019-02-12 ENCOUNTER — APPOINTMENT (OUTPATIENT)
Dept: OTHER | Facility: HOSPITAL | Age: 29
End: 2019-02-12
Attending: PREVENTIVE MEDICINE
Payer: OTHER MISCELLANEOUS

## 2019-02-13 ENCOUNTER — TELEPHONE (OUTPATIENT)
Dept: INTERNAL MEDICINE CLINIC | Facility: CLINIC | Age: 29
End: 2019-02-13

## 2019-02-13 NOTE — ED PROVIDER NOTES
Patient Seen in: [facility] Emergency Department    History   Patient presents with:  Burn (integumentary)    Stated Complaint: right wrist burn    HPI    Juan Jose Leonard is a 42-year-old who presents for evaluation of a burn.   She works downstairs in our She has no petechiae or purpura. Neurologic: Alert and active. Good tone and strength throughout. ED Course   Labs Reviewed - No data to display           MDM   She presents for evaluation of a partial thickness burn. This requires debridement.   Chance Marshall

## 2019-02-13 NOTE — TELEPHONE ENCOUNTER
Attempted to call   re: Message   Received: Yesterday   Message Contents   Tyrel Doran DO  P [facility] [id number] [facility]             Per protocol, please call patients for f/u who were seen in ER/urgent care. Thanks.       Rings and rings No voice mail set up

## 2019-02-15 ENCOUNTER — APPOINTMENT (OUTPATIENT)
Dept: OTHER | Facility: HOSPITAL | Age: 29
End: 2019-02-15
Attending: PREVENTIVE MEDICINE
Payer: OTHER MISCELLANEOUS

## 2019-02-21 ENCOUNTER — APPOINTMENT (OUTPATIENT)
Dept: OTHER | Facility: HOSPITAL | Age: 29
End: 2019-02-21
Attending: PREVENTIVE MEDICINE
Payer: OTHER MISCELLANEOUS

## 2019-03-25 ENCOUNTER — HOSPITAL ENCOUNTER (EMERGENCY)
Facility: HOSPITAL | Age: 29
Discharge: HOME | End: 2019-03-25

## 2019-03-25 VITALS
TEMPERATURE: 98 F | SYSTOLIC BLOOD PRESSURE: 118 MMHG | OXYGEN SATURATION: 98 % | DIASTOLIC BLOOD PRESSURE: 83 MMHG | RESPIRATION RATE: 18 BRPM | HEART RATE: 92 BPM

## 2019-03-25 DIAGNOSIS — M54.32 SCIATICA OF LEFT SIDE: Primary | ICD-10-CM

## 2019-03-25 LAB
BILIRUB UR QL STRIP.AUTO: NEGATIVE
CLARITY UR REFRACT.AUTO: CLEAR
COLOR UR AUTO: YELLOW
EXPIRATION DATE: 2020
GLUCOSE UR STRIP.AUTO-MCNC: NEGATIVE MG/DL
KETONES UR STRIP.AUTO-MCNC: NEGATIVE MG/DL
LEUKOCYTE ESTERASE UR QL STRIP.AUTO: NEGATIVE
NITRITE UR QL STRIP.AUTO: NEGATIVE
PH UR STRIP.AUTO: 6 [PH] (ref 4.5–8)
POCT LOT NUMBER: NORMAL
POCT URINE PREGNANCY: NEGATIVE
PROT UR STRIP.AUTO-MCNC: NEGATIVE MG/DL
RBC UR QL AUTO: NEGATIVE
SP GR UR STRIP.AUTO: 1.02 (ref 1–1.03)
UROBILINOGEN UR STRIP.AUTO-MCNC: 1 MG/DL

## 2019-03-25 PROCEDURE — 81025 URINE PREGNANCY TEST: CPT

## 2019-03-25 PROCEDURE — 99283 EMERGENCY DEPT VISIT LOW MDM: CPT

## 2019-03-25 PROCEDURE — 81003 URINALYSIS AUTO W/O SCOPE: CPT | Performed by: PHYSICIAN ASSISTANT

## 2019-03-25 RX ORDER — IBUPROFEN 600 MG/1
600 TABLET ORAL ONCE
Status: COMPLETED | OUTPATIENT
Start: 2019-03-25 | End: 2019-03-25

## 2019-03-25 RX ORDER — PREDNISONE 20 MG/1
40 TABLET ORAL DAILY
Qty: 10 TABLET | Refills: 0 | Status: SHIPPED | OUTPATIENT
Start: 2019-03-25 | End: 2019-03-30

## 2019-03-25 RX ORDER — CYCLOBENZAPRINE HCL 10 MG
10 TABLET ORAL 3 TIMES DAILY PRN
Qty: 20 TABLET | Refills: 0 | Status: SHIPPED | OUTPATIENT
Start: 2019-03-25 | End: 2019-04-01

## 2019-03-25 RX ORDER — KETOROLAC TROMETHAMINE 30 MG/ML
30 INJECTION, SOLUTION INTRAMUSCULAR; INTRAVENOUS ONCE
Status: DISCONTINUED | OUTPATIENT
Start: 2019-03-25 | End: 2019-03-25

## 2019-03-25 NOTE — ED PROVIDER NOTES
Patient Seen in: [facility] Emergency Department    History   Patient presents with:  Back Pain (musculoskeletal)    Stated Complaint: back injury, pain since Friday night     HPI    Patient is a pleasant 52-year-old female.   She has a medical histo bilaterally  Cardio: Regular rate and rhythm, normal S1-S2, no murmur appreciable  Extremities: Full ROM, strength and sensation are equal.  Two-point discrimination is intact. DTRs are appropriate and equal.  Abdominal: Soft exam.  No distention.   No nasima

## 2019-03-25 NOTE — ED INITIAL ASSESSMENT (HPI)
Pt reports she thinks she twisted her back on Friday. On left side of the back lower, shooting down her leg. No bowel or bladder loss.

## 2019-07-08 ENCOUNTER — HOSPITAL ENCOUNTER (EMERGENCY)
Facility: HOSPITAL | Age: 29
Discharge: HOME | End: 2019-07-08
Attending: EMERGENCY MEDICINE
Payer: MEDICAID

## 2019-07-08 VITALS
SYSTOLIC BLOOD PRESSURE: 116 MMHG | RESPIRATION RATE: 18 BRPM | OXYGEN SATURATION: 99 % | HEART RATE: 82 BPM | DIASTOLIC BLOOD PRESSURE: 82 MMHG | TEMPERATURE: 98 F | BODY MASS INDEX: 27.49 KG/M2 | HEIGHT: 65 IN | WEIGHT: 165 LBS

## 2019-07-08 DIAGNOSIS — M54.40 BACK PAIN OF LUMBAR REGION WITH SCIATICA: Primary | ICD-10-CM

## 2019-07-08 LAB
BILIRUB UR QL STRIP.AUTO: NEGATIVE
COLOR UR AUTO: YELLOW
GLUCOSE UR STRIP.AUTO-MCNC: 50 MG/DL
LEUKOCYTE ESTERASE UR QL STRIP.AUTO: NEGATIVE
NITRITE UR QL STRIP.AUTO: NEGATIVE
PH UR STRIP.AUTO: 6 [PH] (ref 4.5–8)
POCT LOT NUMBER: NORMAL
POCT URINE PREGNANCY: POSITIVE
PROT UR STRIP.AUTO-MCNC: 30 MG/DL
RBC UR QL AUTO: NEGATIVE
SP GR UR STRIP.AUTO: 1.03 (ref 1–1.03)
UROBILINOGEN UR STRIP.AUTO-MCNC: 2 MG/DL

## 2019-07-08 PROCEDURE — 87086 URINE CULTURE/COLONY COUNT: CPT | Performed by: NURSE PRACTITIONER

## 2019-07-08 PROCEDURE — 99283 EMERGENCY DEPT VISIT LOW MDM: CPT

## 2019-07-08 PROCEDURE — 81025 URINE PREGNANCY TEST: CPT

## 2019-07-08 PROCEDURE — 81001 URINALYSIS AUTO W/SCOPE: CPT | Performed by: NURSE PRACTITIONER

## 2019-07-08 RX ORDER — ACETAMINOPHEN 500 MG
1000 TABLET ORAL EVERY 6 HOURS PRN
Qty: 100 TABLET | Refills: 0 | Status: SHIPPED | OUTPATIENT
Start: 2019-07-08 | End: 2019-07-15

## 2019-07-08 NOTE — ED INITIAL ASSESSMENT (HPI)
Patient here with left lower back pain- pain radiates down her left buttocks to her leg and foot. Patient states no motrin or tylenol works. She states its difficult to walk and bend.  This has been going on for a few weeks- she states she has had this befo

## 2019-07-08 NOTE — ED PROVIDER NOTES
Patient Seen in: [facility] Emergency Department    History   Patient presents with:  Back Pain (musculoskeletal)    Stated Complaint: back pain    HPI  36-year-old 6-week pregnant female presents with left-sided sciatic pain.   She states she has a Eyes: Pupils are equal, round, and reactive to light. Conjunctivae and EOM are normal.   Neck: Normal range of motion. Neck supple. Cardiovascular: Normal rate, regular rhythm, normal heart sounds and intact distal pulses.    Pulmonary/Chest: Effort nor lumbar region with sciatica  (primary encounter diagnosis)    Disposition:  Discharge  7/8/2019  4:39 pm    Follow-up:  Primary Care Doctor    Call in 2 days  As needed        Medications Prescribed:  Discharge Medication List as of 7/8/2019  4:40 PM    ST

## 2019-07-08 NOTE — ED NOTES
I reviewed that chart and discussed the case.   I have examined the patient and noted the patient is a 27-year-old female who is 10 weeks pregnant by dates who presents emergency room with a history of some left-sided low back pain with radiation to her but in all 4 extremities. There is mild discomfort with straight leg raise of left leg only. NEURO: Patient is awake, alert and oriented to time place and person. Motor strength is 5 over 5 in all 4 extremities.  There are no gross motor or sensory deficits a

## 2019-07-12 ENCOUNTER — HOSPITAL ENCOUNTER (EMERGENCY)
Facility: HOSPITAL | Age: 29
Discharge: HOME | End: 2019-07-12
Attending: EMERGENCY MEDICINE
Payer: MEDICAID

## 2019-07-12 VITALS
BODY MASS INDEX: 24.99 KG/M2 | HEIGHT: 65 IN | HEART RATE: 88 BPM | WEIGHT: 150 LBS | SYSTOLIC BLOOD PRESSURE: 121 MMHG | RESPIRATION RATE: 18 BRPM | DIASTOLIC BLOOD PRESSURE: 69 MMHG | TEMPERATURE: 98 F | OXYGEN SATURATION: 97 %

## 2019-07-12 DIAGNOSIS — M54.32 SCIATICA OF LEFT SIDE: Primary | ICD-10-CM

## 2019-07-12 PROCEDURE — 99283 EMERGENCY DEPT VISIT LOW MDM: CPT

## 2019-07-12 RX ORDER — CYCLOBENZAPRINE HCL 10 MG
5 TABLET ORAL 3 TIMES DAILY PRN
Qty: 30 TABLET | Refills: 0 | Status: ON HOLD | OUTPATIENT
Start: 2019-07-12 | End: 2019-10-22

## 2019-07-12 NOTE — ED PROVIDER NOTES
Patient Seen in: [facility] Emergency Department    History   Patient presents with:  Pain (neurologic)    Stated Complaint: sciatic pain, pain radiating down left leg for weeks    HPI    Patient is a 52-year-old female who states she has history of accommodation. Mouth normal, neck supple, no meningismus. LUNGS: Lungs clear to auscultation bilaterally. CARDIOVASCULAR: + S1-S2, regular rate and rhythm, no murmurs.   BACK: No CVA tenderness, mild tenderness left lower lumbar paraspinal musculature, n

## 2019-07-12 NOTE — ED INITIAL ASSESSMENT (HPI)
PT c/o sciatica pain that radiates down her left leg. Pt was seen in ER for same last week, however, rpt there has not been much improvement since.

## 2019-09-24 ENCOUNTER — HOSPITAL ENCOUNTER (EMERGENCY)
Facility: HOSPITAL | Age: 29
Discharge: HOME | End: 2019-09-24
Attending: EMERGENCY MEDICINE
Payer: MEDICAID

## 2019-09-24 VITALS
RESPIRATION RATE: 16 BRPM | HEIGHT: 65 IN | DIASTOLIC BLOOD PRESSURE: 80 MMHG | TEMPERATURE: 99 F | BODY MASS INDEX: 26.66 KG/M2 | SYSTOLIC BLOOD PRESSURE: 122 MMHG | WEIGHT: 160 LBS | HEART RATE: 92 BPM | OXYGEN SATURATION: 98 %

## 2019-09-24 DIAGNOSIS — O21.9 NAUSEA AND VOMITING IN PREGNANCY: Primary | ICD-10-CM

## 2019-09-24 LAB
BILIRUB UR QL STRIP.AUTO: NEGATIVE
COLOR UR AUTO: YELLOW
GLUCOSE UR STRIP.AUTO-MCNC: 50 MG/DL
KETONES UR STRIP.AUTO-MCNC: 80 MG/DL
NITRITE UR QL STRIP.AUTO: NEGATIVE
PH UR STRIP.AUTO: 6 [PH] (ref 4.5–8)
PROT UR STRIP.AUTO-MCNC: NEGATIVE MG/DL
RBC UR QL AUTO: NEGATIVE
SP GR UR STRIP.AUTO: 1.03 (ref 1–1.03)
UROBILINOGEN UR STRIP.AUTO-MCNC: 4 MG/DL

## 2019-09-24 PROCEDURE — 99284 EMERGENCY DEPT VISIT MOD MDM: CPT

## 2019-09-24 PROCEDURE — 81001 URINALYSIS AUTO W/SCOPE: CPT | Performed by: EMERGENCY MEDICINE

## 2019-09-24 PROCEDURE — 96361 HYDRATE IV INFUSION ADD-ON: CPT

## 2019-09-24 PROCEDURE — 96374 THER/PROPH/DIAG INJ IV PUSH: CPT

## 2019-09-24 RX ORDER — DOXYLAMINE SUCCINATE AND PYRIDOXINE HYDROCHLORIDE, DELAYED RELEASE TABLETS 10 MG/10 MG 10; 10 MG/1; MG/1
1 TABLET, DELAYED RELEASE ORAL 2 TIMES DAILY PRN
Qty: 120 TABLET | Refills: 0 | Status: SHIPPED | OUTPATIENT
Start: 2019-09-24 | End: 2019-10-06

## 2019-09-24 RX ORDER — METOCLOPRAMIDE HYDROCHLORIDE 5 MG/ML
10 INJECTION INTRAMUSCULAR; INTRAVENOUS ONCE
Status: COMPLETED | OUTPATIENT
Start: 2019-09-24 | End: 2019-09-24

## 2019-09-24 NOTE — ED PROVIDER NOTES
Patient Seen in: [facility] Emergency Department      History   Patient presents with:  Nausea/Vomiting/Diarrhea (gastrointestinal)    Stated Complaint: Vomitting since yesterday, thinks she could be preg?     IDA Easton is a pleasant 29-year-o cyanosis or edema.   Back exam is normal.  No rash extremity exam is normal.       ED Course     Labs Reviewed   URINALYSIS WITH CULTURE REFLEX - Abnormal; Notable for the following components:       Result Value    Clarity Urine Hazy (*)     Glucose Urine

## 2019-09-24 NOTE — PROGRESS NOTES
Dr Kristine Humphrey called and updated on pt arrival to ED. Continue to eval in ED and call L&D if any OB concerns.  Doppler FHT's in ED

## 2019-09-24 NOTE — ED NOTES
Pt sts she is 21 weeks preg. C/o back pain, vomiting and abd cramping. Spoke to [employer], will call back to see if pt should be seen in the ER or OB. Pt updated.

## 2019-09-24 NOTE — ED NOTES
DC instructions and E-RX reviewed with pt. No distress noted. Pt denies any needs. Pt thanks staff for care. Denies need for R Marcia Fatima 23 out of ER.

## 2019-09-24 NOTE — ED INITIAL ASSESSMENT (HPI)
Pt to ED with c/o vomit x 6-7 times since last night. Denies diarrhea. Afebrile. Pt also c/o back and abd cramping.  No vag discharge or bleeding

## 2019-10-22 ENCOUNTER — HOSPITAL ENCOUNTER (EMERGENCY)
Facility: HOSPITAL | Age: 29
Discharge: STILL ADMITTED OR ADMINISTRATIVE | End: 2019-10-22
Payer: MEDICAID

## 2019-10-22 ENCOUNTER — HOSPITAL ENCOUNTER (OUTPATIENT)
Facility: HOSPITAL | Age: 29
Setting detail: OBSERVATION
Discharge: HOME | End: 2019-10-22
Attending: OBSTETRICS & GYNECOLOGY | Admitting: OBSTETRICS & GYNECOLOGY
Payer: MEDICAID

## 2019-10-22 VITALS
TEMPERATURE: 98 F | SYSTOLIC BLOOD PRESSURE: 110 MMHG | RESPIRATION RATE: 16 BRPM | HEART RATE: 73 BPM | DIASTOLIC BLOOD PRESSURE: 68 MMHG | BODY MASS INDEX: 27 KG/M2 | WEIGHT: 160 LBS

## 2019-10-22 PROBLEM — Z34.90 PREGNANCY: Status: ACTIVE | Noted: 2019-10-22

## 2019-10-22 PROCEDURE — 81002 URINALYSIS NONAUTO W/O SCOPE: CPT

## 2019-10-22 PROCEDURE — 99213 OFFICE O/P EST LOW 20 MIN: CPT

## 2019-10-22 RX ORDER — CHOLECALCIFEROL (VITAMIN D3) 25 MCG
1 TABLET,CHEWABLE ORAL DAILY
COMMUNITY

## 2019-10-22 NOTE — PROGRESS NOTES
25.5 wks presents to L&D triage c/o abdominal pain and diarrhea for two weeks. Pt reports my BM are soft and solid now and there is mucus mixed with the stool. Pt denies N&V, abdomen soft and nontender on palpation.  She states she received OB care fro

## 2019-10-22 NOTE — NST
Nonstress Test   Patient: Irwin Wyman    Gestation: 25w5d    NST:       Variability: Moderate           Accelerations: No           Decelerations: None            Baseline: 150 BPM           Uterine Irritability: Yes           Contractions: Not pre

## 2019-10-22 NOTE — ED NOTES
Patient finally stated that she is over 5 months pregnant  Charge RN was notified she is being walked to labor and delivery

## 2019-10-22 NOTE — PROGRESS NOTES
Discharge instructions given, gave phone number of Jamshid Burks () and Mackenzie Davis () to call to assist with finding a OB that delivers at [facility] that takes her insurance.  Pt refusing to go to ED at this time, states \

## 2019-10-22 NOTE — PROGRESS NOTES
Discussed with pt will EFM for an hour and she has the option of going to the [facility] and the [facility] ED to be evaluated for her stomach and BM complaints or f/u with her CNM. Pt chose to be seen in the [facility] and the [facility] ED.  Pt states she is going to deliver at [facility]. Maty

## 2020-01-06 ENCOUNTER — OFFICE VISIT (OUTPATIENT)
Dept: PERINATAL CARE | Facility: HOSPITAL | Age: 30
End: 2020-01-06
Attending: OBSTETRICS & GYNECOLOGY
Payer: COMMERCIAL

## 2020-01-06 ENCOUNTER — INITIAL PRENATAL (OUTPATIENT)
Dept: OBGYN CLINIC | Facility: CLINIC | Age: 30
End: 2020-01-06
Payer: COMMERCIAL

## 2020-01-06 ENCOUNTER — TELEPHONE (OUTPATIENT)
Dept: OBGYN CLINIC | Facility: CLINIC | Age: 30
End: 2020-01-06

## 2020-01-06 ENCOUNTER — HOSPITAL ENCOUNTER (OUTPATIENT)
Facility: HOSPITAL | Age: 30
Setting detail: OBSERVATION
Discharge: HOME | End: 2020-01-06
Attending: OBSTETRICS & GYNECOLOGY | Admitting: OBSTETRICS & GYNECOLOGY
Payer: COMMERCIAL

## 2020-01-06 VITALS
BODY MASS INDEX: 27.32 KG/M2 | DIASTOLIC BLOOD PRESSURE: 60 MMHG | WEIGHT: 164 LBS | HEIGHT: 65 IN | SYSTOLIC BLOOD PRESSURE: 118 MMHG

## 2020-01-06 VITALS
SYSTOLIC BLOOD PRESSURE: 120 MMHG | DIASTOLIC BLOOD PRESSURE: 70 MMHG | BODY MASS INDEX: 27 KG/M2 | HEART RATE: 97 BPM | WEIGHT: 164 LBS

## 2020-01-06 VITALS
WEIGHT: 164 LBS | DIASTOLIC BLOOD PRESSURE: 78 MMHG | HEIGHT: 65 IN | BODY MASS INDEX: 27.32 KG/M2 | SYSTOLIC BLOOD PRESSURE: 118 MMHG | HEART RATE: 80 BPM

## 2020-01-06 DIAGNOSIS — O34.33 PREMATURE CERVICAL DILATION, THIRD TRIMESTER: Primary | ICD-10-CM

## 2020-01-06 DIAGNOSIS — R77.2 ELEVATED AFP: ICD-10-CM

## 2020-01-06 DIAGNOSIS — O34.33 PREMATURE CERVICAL DILATION, THIRD TRIMESTER: ICD-10-CM

## 2020-01-06 DIAGNOSIS — Z23 NEED FOR TDAP VACCINATION: ICD-10-CM

## 2020-01-06 DIAGNOSIS — Z34.83 PRENATAL CARE, SUBSEQUENT PREGNANCY, THIRD TRIMESTER: ICD-10-CM

## 2020-01-06 PROBLEM — Z34.93 PREGNANCY WITH PRENATAL CARE ELSEWHERE IN THIRD TRIMESTER: Status: ACTIVE | Noted: 2020-01-06

## 2020-01-06 LAB — MULTISTIX LOT#: NORMAL NUMERIC

## 2020-01-06 PROCEDURE — 87653 STREP B DNA AMP PROBE: CPT | Performed by: OBSTETRICS & GYNECOLOGY

## 2020-01-06 PROCEDURE — 90471 IMMUNIZATION ADMIN: CPT | Performed by: OBSTETRICS & GYNECOLOGY

## 2020-01-06 PROCEDURE — 99213 OFFICE O/P EST LOW 20 MIN: CPT

## 2020-01-06 PROCEDURE — 96372 THER/PROPH/DIAG INJ SC/IM: CPT

## 2020-01-06 PROCEDURE — 90715 TDAP VACCINE 7 YRS/> IM: CPT | Performed by: OBSTETRICS & GYNECOLOGY

## 2020-01-06 PROCEDURE — 87081 CULTURE SCREEN ONLY: CPT | Performed by: OBSTETRICS & GYNECOLOGY

## 2020-01-06 PROCEDURE — 81002 URINALYSIS NONAUTO W/O SCOPE: CPT | Performed by: OBSTETRICS & GYNECOLOGY

## 2020-01-06 PROCEDURE — 99202 OFFICE O/P NEW SF 15 MIN: CPT | Performed by: OBSTETRICS & GYNECOLOGY

## 2020-01-06 PROCEDURE — 59025 FETAL NON-STRESS TEST: CPT

## 2020-01-06 PROCEDURE — 76819 FETAL BIOPHYS PROFIL W/O NST: CPT | Performed by: OBSTETRICS & GYNECOLOGY

## 2020-01-06 PROCEDURE — 76816 OB US FOLLOW-UP PER FETUS: CPT | Performed by: OBSTETRICS & GYNECOLOGY

## 2020-01-06 RX ORDER — BETAMETHASONE SODIUM PHOSPHATE AND BETAMETHASONE ACETATE 3; 3 MG/ML; MG/ML
12 INJECTION, SUSPENSION INTRA-ARTICULAR; INTRALESIONAL; INTRAMUSCULAR; SOFT TISSUE EVERY 24 HOURS
Status: DISCONTINUED | OUTPATIENT
Start: 2020-01-06 | End: 2020-01-06

## 2020-01-06 NOTE — PROGRESS NOTES
Indication: S<D.   ____________________________________________________________________________  History: Age: 30 years.  : 4 Para: 3.  ____________________________________________________________________________  Dating:  LMP: 2019 EDC: 3 Q1: 4.1 cm. Q2: 4.2 cm. Q3: 2.2 cm. Q4: 4.3 cm. Biophysical Profile: Fetal body movements: normal (2), Fetal tone: normal (2), Fetal breathing movements: normal (2), Amniotic fluid volume: normal (2). Score 8 / 8.      Summary:  testing is reassu

## 2020-01-06 NOTE — NST
Nonstress Test   Patient: Corene Holiday    Gestation: 36w4d    NST:       Variability: Moderate           Accelerations: Yes           Decelerations: Variable(x1)            Baseline: 140 BPM                       Contractions: Regular           Yessenia

## 2020-01-06 NOTE — PROGRESS NOTES
Pt is a 30year old female admitted to TRG1/TRG1-A. Pt is  36w4d intra-uterine pregnancy.     Patient presents with:  R/o Labor: Pt sent from OB office d/t advanced dilation  Betamethasone Injection     History obtained, oriented to room, staff, and

## 2020-01-06 NOTE — PROGRESS NOTES
[facility]  Obstetrics and Gynecology  History & Physical    CC: Transfer of prenatal care at 36w4d     Subjective:     HPI:  Sanjay Beal is a 30year old  female at 37w2d here as transfer of care.    She was supposed to bring prena MEDS:  prenatal multivitamin plus DHA 27-0.8-228 MG Oral Cap, Take 1 capsule by mouth daily. , Disp: , Rfl:     No current facility-administered medications on file prior to visit.        Allergies:      Latex                   HIVES  Walnuts Abd: soft, non-tender, uterine fundus non-tender. FH 32 cm. FHT 140s   Ext: non-tender, no edema  :  Ext genitalia WNL. GBS collected. Mucus discharge. Slight thicker white discharge with mucus. SVE 4-5/60/-2, vertex.       Assessment/Plan:     Adriano

## 2020-01-06 NOTE — PROGRESS NOTES
Pt sent from office due to STACY BARRERA and limited [facility]  States +FM  No complaints

## 2020-01-06 NOTE — PROGRESS NOTES
Pt wishes to go home @ this time. Discharge instructions given, pt verbalizes understanding. Discharged to home per ambulatory in stable condition with written and verbal instructions.

## 2020-01-07 ENCOUNTER — APPOINTMENT (OUTPATIENT)
Dept: OBGYN CLINIC | Facility: HOSPITAL | Age: 30
End: 2020-01-07
Payer: COMMERCIAL

## 2020-01-07 ENCOUNTER — HOSPITAL ENCOUNTER (OUTPATIENT)
Facility: HOSPITAL | Age: 30
Setting detail: OBSERVATION
Discharge: HOME | End: 2020-01-07
Attending: OBSTETRICS & GYNECOLOGY | Admitting: OBSTETRICS & GYNECOLOGY
Payer: COMMERCIAL

## 2020-01-07 ENCOUNTER — HOSPITAL ENCOUNTER (OUTPATIENT)
Facility: HOSPITAL | Age: 30
Discharge: HOME | End: 2020-01-07
Attending: OBSTETRICS & GYNECOLOGY | Admitting: OBSTETRICS & GYNECOLOGY
Payer: COMMERCIAL

## 2020-01-07 VITALS
TEMPERATURE: 98 F | DIASTOLIC BLOOD PRESSURE: 69 MMHG | SYSTOLIC BLOOD PRESSURE: 124 MMHG | BODY MASS INDEX: 27.32 KG/M2 | HEART RATE: 95 BPM | HEIGHT: 65 IN | RESPIRATION RATE: 18 BRPM | WEIGHT: 164 LBS

## 2020-01-07 LAB
ANTIBODY SCREEN: NEGATIVE
BASOPHILS # BLD AUTO: 0.03 X10(3) UL (ref 0–0.2)
BASOPHILS NFR BLD AUTO: 0.2 %
DEPRECATED RDW RBC AUTO: 42.5 FL (ref 35.1–46.3)
EOSINOPHIL # BLD AUTO: 0 X10(3) UL (ref 0–0.7)
EOSINOPHIL NFR BLD AUTO: 0 %
ERYTHROCYTE [DISTWIDTH] IN BLOOD BY AUTOMATED COUNT: 12.7 % (ref 11–15)
HBV SURFACE AG SER-ACNC: <0.1 [IU]/L
HBV SURFACE AG SERPL QL IA: NONREACTIVE
HCT VFR BLD AUTO: 39.7 % (ref 35–48)
HGB BLD-MCNC: 13.4 G/DL (ref 12–16)
IMM GRANULOCYTES # BLD AUTO: 0.27 X10(3) UL (ref 0–1)
IMM GRANULOCYTES NFR BLD: 1.4 %
LYMPHOCYTES # BLD AUTO: 1.52 X10(3) UL (ref 1–4)
LYMPHOCYTES NFR BLD AUTO: 8.1 %
MCH RBC QN AUTO: 30.9 PG (ref 26–34)
MCHC RBC AUTO-ENTMCNC: 33.8 G/DL (ref 31–37)
MCV RBC AUTO: 91.7 FL (ref 80–100)
MONOCYTES # BLD AUTO: 1.32 X10(3) UL (ref 0.1–1)
MONOCYTES NFR BLD AUTO: 7 %
NEUTROPHILS # BLD AUTO: 15.63 X10 (3) UL (ref 1.5–7.7)
NEUTROPHILS # BLD AUTO: 15.63 X10(3) UL (ref 1.5–7.7)
NEUTROPHILS NFR BLD AUTO: 83.3 %
PLATELET # BLD AUTO: 156 10(3)UL (ref 150–450)
RAPID HIV: NONREACTIVE
RBC # BLD AUTO: 4.33 X10(6)UL (ref 3.8–5.3)
RH BLOOD TYPE: POSITIVE
RUBV IGG SER QL: POSITIVE
RUBV IGG SER-ACNC: 28.5 IU/ML (ref 10–?)
RUPTURE OF MEMBRANE (ROM): NEGATIVE
T PALLIDUM AB SER QL IA: NONREACTIVE
WBC # BLD AUTO: 18.8 X10(3) UL (ref 4–11)

## 2020-01-07 PROCEDURE — 86780 TREPONEMA PALLIDUM: CPT | Performed by: OBSTETRICS & GYNECOLOGY

## 2020-01-07 PROCEDURE — 36415 COLL VENOUS BLD VENIPUNCTURE: CPT

## 2020-01-07 PROCEDURE — 86850 RBC ANTIBODY SCREEN: CPT | Performed by: OBSTETRICS & GYNECOLOGY

## 2020-01-07 PROCEDURE — 84112 EVAL AMNIOTIC FLUID PROTEIN: CPT | Performed by: OBSTETRICS & GYNECOLOGY

## 2020-01-07 PROCEDURE — 99214 OFFICE O/P EST MOD 30 MIN: CPT

## 2020-01-07 PROCEDURE — 86901 BLOOD TYPING SEROLOGIC RH(D): CPT | Performed by: OBSTETRICS & GYNECOLOGY

## 2020-01-07 PROCEDURE — 59025 FETAL NON-STRESS TEST: CPT

## 2020-01-07 PROCEDURE — 87340 HEPATITIS B SURFACE AG IA: CPT | Performed by: OBSTETRICS & GYNECOLOGY

## 2020-01-07 PROCEDURE — 86701 HIV-1ANTIBODY: CPT | Performed by: OBSTETRICS & GYNECOLOGY

## 2020-01-07 PROCEDURE — 96372 THER/PROPH/DIAG INJ SC/IM: CPT

## 2020-01-07 PROCEDURE — 86762 RUBELLA ANTIBODY: CPT | Performed by: OBSTETRICS & GYNECOLOGY

## 2020-01-07 PROCEDURE — 86900 BLOOD TYPING SEROLOGIC ABO: CPT | Performed by: OBSTETRICS & GYNECOLOGY

## 2020-01-07 PROCEDURE — 85025 COMPLETE CBC W/AUTO DIFF WBC: CPT | Performed by: OBSTETRICS & GYNECOLOGY

## 2020-01-07 RX ORDER — BETAMETHASONE SODIUM PHOSPHATE AND BETAMETHASONE ACETATE 3; 3 MG/ML; MG/ML
12 INJECTION, SUSPENSION INTRA-ARTICULAR; INTRALESIONAL; INTRAMUSCULAR; SOFT TISSUE ONCE
Status: COMPLETED | OUTPATIENT
Start: 2020-01-07 | End: 2020-01-07

## 2020-01-07 RX ORDER — BETAMETHASONE SODIUM PHOSPHATE AND BETAMETHASONE ACETATE 3; 3 MG/ML; MG/ML
INJECTION, SUSPENSION INTRA-ARTICULAR; INTRALESIONAL; INTRAMUSCULAR; SOFT TISSUE
Status: COMPLETED
Start: 2020-01-07 | End: 2020-01-07

## 2020-01-08 ENCOUNTER — TELEPHONE (OUTPATIENT)
Dept: OBGYN CLINIC | Facility: CLINIC | Age: 30
End: 2020-01-08

## 2020-01-08 PROBLEM — O09.293 HX MATERNAL GBS (GROUP B STREPTOCOCCUS) AFFECTED NEONATE, PREGNANT, THIRD TRIMESTER: Status: ACTIVE | Noted: 2020-01-08

## 2020-01-08 NOTE — PROGRESS NOTES
In room to d/c pt. D/c instructiosn explained to the pt. Pt in agreement  efm removed. Pt up to change.   Pt to homein stable cond

## 2020-01-08 NOTE — PROGRESS NOTES
Pt is a 30year old female admitted to TRG5/TRG5-A, Patient presents with:  R/o Labor: c/o regular painful ucs for last about 1750 today. Pt c/o leaking fluid since 1000 today. Pt is 36w5d intra-uterine pregnancy. Reports +fetal movement.    History obt

## 2020-01-08 NOTE — NST
Nonstress Test   Patient: Lenard Jacobs    Gestation: 36w5d    NST:       Variability: Moderate           Accelerations: Yes           Decelerations: None            Baseline: 135 BPM                       Contractions: Irregular

## 2020-01-09 NOTE — PROGRESS NOTES
Patient aware of GBS results and recommendations for antibiotics in labor. Patient verbalized understanding.

## 2020-01-09 NOTE — PROGRESS NOTES
Patient aware of results and recommendations. Patient states she is 6cm dilated. Instructed on signs of labor and to go to the hospital if experiencing any of those signs. Patient verbalized understanding.

## 2020-01-10 ENCOUNTER — HOSPITAL ENCOUNTER (OUTPATIENT)
Facility: HOSPITAL | Age: 30
Setting detail: OBSERVATION
Discharge: HOME | End: 2020-01-10
Attending: OBSTETRICS & GYNECOLOGY | Admitting: OBSTETRICS & GYNECOLOGY
Payer: COMMERCIAL

## 2020-01-10 VITALS
HEIGHT: 65 IN | SYSTOLIC BLOOD PRESSURE: 108 MMHG | HEART RATE: 86 BPM | BODY MASS INDEX: 27.32 KG/M2 | RESPIRATION RATE: 16 BRPM | DIASTOLIC BLOOD PRESSURE: 57 MMHG | TEMPERATURE: 98 F | WEIGHT: 164 LBS

## 2020-01-10 PROCEDURE — 59025 FETAL NON-STRESS TEST: CPT

## 2020-01-10 PROCEDURE — 99213 OFFICE O/P EST LOW 20 MIN: CPT

## 2020-01-11 NOTE — PROGRESS NOTES
Pt  EDC 20 presents to L&D with c/o of ucs 5-7 mins apart since apx 191. Pt denies vag bleeding or leaking of fluid, states + fetal movement. Pt states was 6cm in office last visit.  Pt labels verified per pt and this RN, POC discussed, pt verb un

## 2020-01-11 NOTE — PROGRESS NOTES
D/C instructions given to pt and discussed. Pt  Verb understanding and agreeable to POC. Pt states no questions at this time. Pt refused wheelchair out, pt escorted off unit by this RN with instructions in hand.

## 2020-01-11 NOTE — NST
Nonstress Test   Patient: Sav Earing    Gestation: 37w1d    NST:       Variability: Moderate           Accelerations: Yes           Decelerations: None            Baseline: 140 BPM           Uterine Irritability: No           Contractions: Irreg

## 2020-01-13 ENCOUNTER — ANESTHESIA (OUTPATIENT)
Dept: OBGYN UNIT | Facility: HOSPITAL | Age: 30
End: 2020-01-13
Payer: COMMERCIAL

## 2020-01-13 ENCOUNTER — ANESTHESIA EVENT (OUTPATIENT)
Dept: OBGYN UNIT | Facility: HOSPITAL | Age: 30
End: 2020-01-13
Payer: COMMERCIAL

## 2020-01-13 ENCOUNTER — HOSPITAL ENCOUNTER (INPATIENT)
Facility: HOSPITAL | Age: 30
LOS: 1 days | Discharge: HOME | End: 2020-01-14
Attending: OBSTETRICS & GYNECOLOGY | Admitting: OBSTETRICS & GYNECOLOGY
Payer: COMMERCIAL

## 2020-01-13 LAB
ANTIBODY SCREEN: NEGATIVE
DEPRECATED RDW RBC AUTO: 42.5 FL (ref 35.1–46.3)
ERYTHROCYTE [DISTWIDTH] IN BLOOD BY AUTOMATED COUNT: 12.7 % (ref 11–15)
HCT VFR BLD AUTO: 43.4 % (ref 35–48)
HGB BLD-MCNC: 14.9 G/DL (ref 12–16)
MCH RBC QN AUTO: 31.4 PG (ref 26–34)
MCHC RBC AUTO-ENTMCNC: 34.3 G/DL (ref 31–37)
MCV RBC AUTO: 91.4 FL (ref 80–100)
PLATELET # BLD AUTO: 179 10(3)UL (ref 150–450)
RBC # BLD AUTO: 4.75 X10(6)UL (ref 3.8–5.3)
RH BLOOD TYPE: POSITIVE
T PALLIDUM AB SER QL IA: NONREACTIVE
WBC # BLD AUTO: 13.9 X10(3) UL (ref 4–11)

## 2020-01-13 PROCEDURE — 59409 OBSTETRICAL CARE: CPT | Performed by: OBSTETRICS & GYNECOLOGY

## 2020-01-13 RX ORDER — EPHEDRINE SULFATE/0.9% NACL/PF 25 MG/5 ML
5 SYRINGE (ML) INTRAVENOUS AS NEEDED
Status: DISCONTINUED | OUTPATIENT
Start: 2020-01-13 | End: 2020-01-13

## 2020-01-13 RX ORDER — AMMONIA INHALANTS 0.04 G/.3ML
0.3 INHALANT RESPIRATORY (INHALATION) AS NEEDED
Status: DISCONTINUED | OUTPATIENT
Start: 2020-01-13 | End: 2020-01-13 | Stop reason: HOSPADM

## 2020-01-13 RX ORDER — ZOLPIDEM TARTRATE 5 MG/1
5 TABLET ORAL NIGHTLY PRN
Status: DISCONTINUED | OUTPATIENT
Start: 2020-01-13 | End: 2020-01-15

## 2020-01-13 RX ORDER — TERBUTALINE SULFATE 1 MG/ML
0.25 INJECTION, SOLUTION SUBCUTANEOUS AS NEEDED
Status: DISCONTINUED | OUTPATIENT
Start: 2020-01-13 | End: 2020-01-13 | Stop reason: HOSPADM

## 2020-01-13 RX ORDER — SODIUM CHLORIDE, SODIUM LACTATE, POTASSIUM CHLORIDE, CALCIUM CHLORIDE 600; 310; 30; 20 MG/100ML; MG/100ML; MG/100ML; MG/100ML
INJECTION, SOLUTION INTRAVENOUS CONTINUOUS
Status: DISCONTINUED | OUTPATIENT
Start: 2020-01-13 | End: 2020-01-13 | Stop reason: HOSPADM

## 2020-01-13 RX ORDER — ACETAMINOPHEN 325 MG/1
650 TABLET ORAL EVERY 6 HOURS PRN
Status: DISCONTINUED | OUTPATIENT
Start: 2020-01-13 | End: 2020-01-15

## 2020-01-13 RX ORDER — TRISODIUM CITRATE DIHYDRATE AND CITRIC ACID MONOHYDRATE 500; 334 MG/5ML; MG/5ML
30 SOLUTION ORAL AS NEEDED
Status: DISCONTINUED | OUTPATIENT
Start: 2020-01-13 | End: 2020-01-13 | Stop reason: HOSPADM

## 2020-01-13 RX ORDER — IBUPROFEN 600 MG/1
600 TABLET ORAL ONCE AS NEEDED
Status: DISCONTINUED | OUTPATIENT
Start: 2020-01-13 | End: 2020-01-13 | Stop reason: HOSPADM

## 2020-01-13 RX ORDER — DOCUSATE SODIUM 100 MG/1
100 CAPSULE, LIQUID FILLED ORAL
Status: DISCONTINUED | OUTPATIENT
Start: 2020-01-13 | End: 2020-01-15

## 2020-01-13 RX ORDER — DEXTROSE, SODIUM CHLORIDE, SODIUM LACTATE, POTASSIUM CHLORIDE, AND CALCIUM CHLORIDE 5; .6; .31; .03; .02 G/100ML; G/100ML; G/100ML; G/100ML; G/100ML
INJECTION, SOLUTION INTRAVENOUS AS NEEDED
Status: DISCONTINUED | OUTPATIENT
Start: 2020-01-13 | End: 2020-01-13 | Stop reason: HOSPADM

## 2020-01-13 RX ORDER — SIMETHICONE 80 MG
80 TABLET,CHEWABLE ORAL 3 TIMES DAILY PRN
Status: DISCONTINUED | OUTPATIENT
Start: 2020-01-13 | End: 2020-01-15

## 2020-01-13 RX ORDER — BISACODYL 10 MG
10 SUPPOSITORY, RECTAL RECTAL ONCE AS NEEDED
Status: ACTIVE | OUTPATIENT
Start: 2020-01-13 | End: 2020-01-13

## 2020-01-13 RX ORDER — NALBUPHINE HCL 10 MG/ML
2.5 AMPUL (ML) INJECTION
Status: DISCONTINUED | OUTPATIENT
Start: 2020-01-13 | End: 2020-01-13

## 2020-01-13 RX ORDER — IBUPROFEN 600 MG/1
600 TABLET ORAL EVERY 6 HOURS
Status: DISCONTINUED | OUTPATIENT
Start: 2020-01-14 | End: 2020-01-15

## 2020-01-13 NOTE — PROGRESS NOTES
Pt is a 30year old female admitted to 108/108-A, No chief complaint on file. Pt is 37w4d intra-uterine pregnancy. Denies any leaking of fluid. Reports +fetal movement. History obtained, consents signed. Oriented to room, staff, and plan of care.

## 2020-01-13 NOTE — ANESTHESIA PROCEDURE NOTES
Labor Analgesia  Performed by: Yenifer Hallman MD  Authorized by: Yenifer Hallman MD       General Information and Staff    Start Time:  1/13/2020 1:50 PM  End Time:  1/13/2020 2:12 PM  Anesthesiologist:  Yenifer Hallman MD  Performed by:   Anesthesiologist  Yaritza

## 2020-01-13 NOTE — PLAN OF CARE
Problem: Patient/Family Goals  Goal: Patient/Family Long Term Goal  Description  Patient's Long Term Goal: to have adequate pain management     Interventions:  -   - See additional Care Plan goals for specific interventions   Outcome: Progressing  Goal:

## 2020-01-13 NOTE — ANESTHESIA PREPROCEDURE EVALUATION
PRE-OP EVALUATION    Patient Name: Kevin Orantes    Pre-op Diagnosis: * No surgery found *    * No surgery found *    * Surgery not found *    Pre-op vitals reviewed.   Temp: 97.2 °F (36.2 °C)  Pulse: 96  Resp: 16  BP: 118/65  SpO2: 100 %  Body mass Neuro/Psych        (+) anxiety                            Past Surgical History:   Procedure Laterality Date   • TONSILLECTOMY     • WISDOM TEETH REMOVED       Social History    Tobacco Use      Smoking status: Former Smoker        Packs/day: 0.00

## 2020-01-14 VITALS
HEART RATE: 68 BPM | TEMPERATURE: 98 F | HEIGHT: 65 IN | WEIGHT: 164 LBS | RESPIRATION RATE: 18 BRPM | OXYGEN SATURATION: 100 % | BODY MASS INDEX: 27.32 KG/M2 | DIASTOLIC BLOOD PRESSURE: 82 MMHG | SYSTOLIC BLOOD PRESSURE: 119 MMHG

## 2020-01-14 LAB
BASOPHILS # BLD AUTO: 0.02 X10(3) UL (ref 0–0.2)
BASOPHILS NFR BLD AUTO: 0.1 %
DEPRECATED RDW RBC AUTO: 43 FL (ref 35.1–46.3)
EOSINOPHIL # BLD AUTO: 0.04 X10(3) UL (ref 0–0.7)
EOSINOPHIL NFR BLD AUTO: 0.3 %
ERYTHROCYTE [DISTWIDTH] IN BLOOD BY AUTOMATED COUNT: 13 % (ref 11–15)
HCT VFR BLD AUTO: 37 % (ref 35–48)
HGB BLD-MCNC: 12.5 G/DL (ref 12–16)
IMM GRANULOCYTES # BLD AUTO: 0.05 X10(3) UL (ref 0–1)
IMM GRANULOCYTES NFR BLD: 0.4 %
LYMPHOCYTES # BLD AUTO: 2.23 X10(3) UL (ref 1–4)
LYMPHOCYTES NFR BLD AUTO: 16.4 %
MCH RBC QN AUTO: 30.8 PG (ref 26–34)
MCHC RBC AUTO-ENTMCNC: 33.8 G/DL (ref 31–37)
MCV RBC AUTO: 91.1 FL (ref 80–100)
MONOCYTES # BLD AUTO: 0.95 X10(3) UL (ref 0.1–1)
MONOCYTES NFR BLD AUTO: 7 %
NEUTROPHILS # BLD AUTO: 10.29 X10 (3) UL (ref 1.5–7.7)
NEUTROPHILS # BLD AUTO: 10.29 X10(3) UL (ref 1.5–7.7)
NEUTROPHILS NFR BLD AUTO: 75.8 %
PLATELET # BLD AUTO: 144 10(3)UL (ref 150–450)
RBC # BLD AUTO: 4.06 X10(6)UL (ref 3.8–5.3)
WBC # BLD AUTO: 13.6 X10(3) UL (ref 4–11)

## 2020-01-14 RX ORDER — IBUPROFEN 600 MG/1
600 TABLET ORAL EVERY 6 HOURS
Qty: 30 TABLET | Refills: 0 | Status: SHIPPED | OUTPATIENT
Start: 2020-01-15 | End: 2020-02-24

## 2020-01-14 NOTE — PROGRESS NOTES
OB progress note    30year old  now PPD#1 s/p  at 37w4d   Afebrile, VSS  Pain controlled  O+.  GBS neg by culture but h/o infant in NICU for GBS in previous pregnancy   Ambulation encouraged  Disposition - home tomorrow     Subjective: Pain cont

## 2020-01-14 NOTE — PROGRESS NOTES
NURSING ADMISSION NOTE      Patient admitted via Wheelchair  Oriented to room. Safety precautions initiated. Bed in low position. Call light in reach. Assessment completed. POC discussed with pt and spouse, both verbalized understanding.  Baby to nu

## 2020-01-14 NOTE — PROGRESS NOTES
Patient ambulated to bathroom with assistance and voided without difficulty. Patient walter-care performed, mesh panties and pad applied and gown changed. Patient tolerated well.     Patient and infant boy transferred in stable condition via wheelchair to [facility]

## 2020-01-14 NOTE — H&P
Kwabena Patient Status:  Inpatient    1990 MRN [id number]   Location [facility] Attending Vanessa Minor, [address] Day # 0 PCP None Pcp     Date of Admission:  2020 of Onset   • Diabetes Father    • Hypertension Father    • Diabetes Mother    • Hypertension Mother    • Breast Cancer Mother 48        Not sure if genetic testing done   • No Known Problems Daughter    • No Known Problems Maternal Grandmother    • No Know questions answered, all appropriate consents will be signed at the Hospital. Admission is planned for today. Expectant management. and Anticipate vaginal delivery. Brendalyn Alpers [employer]  1/13/2020  7:24 PM

## 2020-01-14 NOTE — PROGRESS NOTES
Labor Analgesia Follow Up Note    Patient underwent epidural anesthesia for labor analgesia,    Placenta Date/Time: 1/13/2020  6:37 PM    Delivery Date/Time[de-identified] 1/13/2020  6:34 PM    /77 (BP Location: Left arm)   Pulse 85   Temp 98.5 °F (36.9 °C) (Oral

## 2020-01-14 NOTE — L&D DELIVERY NOTE
Tristian Moyer [[id number]]    Labor Events     labor?:  No   steroids?:  None  Antibiotics received during labor?:  No  Antibiotics (enter # doses in comment):  none  Rupture date/time:       Rupture type:  AROM  Fluid color: Key:     0 1 2    Skin color Blue or pale Acrocyanotic Completely pink    Heart rate Absent <100 bpm >100 bpm    Reflex irritability No response Grimace Cry or active withdrawal    Muscle tone Limp Some flexion Active motion    Respiratory effort Absent We

## 2020-01-15 ENCOUNTER — TELEPHONE (OUTPATIENT)
Dept: CASE MANAGEMENT | Facility: HOSPITAL | Age: 30
End: 2020-01-15

## 2020-01-15 NOTE — CM/SW NOTE
Called by Charge RN from Mother/Baby with request for follow up on this patient. Being discharged from the hospital with  and 2-3 more small children. Father of new baby dropped the toddlers off and left.   Emily Elias will be going home in her own c

## 2020-01-15 NOTE — PROGRESS NOTES
Patient's three daughters ages 10, 3 and 2 noted to be in the room sleeping on the couch under the covers.  Pt was made aware that these kids cannot stay here overnight and that they should have been taken home at 8pm. Pt was instructe to have someone come a

## 2020-01-15 NOTE — PROGRESS NOTES
Discharge instructions for pt and baby  discussed with pt. Verbalized understanding. Also made aware that  will call and  follow up with her tomorrow.

## 2020-01-15 NOTE — PROGRESS NOTES
Dr Alban Vitale was called and informed and wants to be discharged now; Dr Alban Vitale okay to discharge pt. DR Yenifer Coburn was called and made aware of the situation. Will discharged baby and follow up with him tomorrow.

## 2020-01-15 NOTE — DISCHARGE SUMMARY
[facility]    Discharge Summary    Gisela Myers Patient Status:  Inpatient    1990 MRN [id number]   [facility] 2SW-J Attending Caron Villa, [address] [facility] Day # 1 PCP None Pcp     Date of Admission: 2020    Date of Other Discharge Instructions:       Nothing in the vagina for 6 weeks.      Call office for fever 100.4 or higher, increased vaginal bleeding soaking greater than 1 pad per hour, increased abdominal/pelvic pain, shortness of breath, chest pain, leg

## 2020-01-15 NOTE — PROGRESS NOTES
SW contacted pt to provide resources and support due to phone call from RN after discharge that pt was left with small children and had to drive herself home from Mother/Baby.   Pt confirms that she was overwhelmed yesterday when her boyfriend had to leave

## 2020-01-15 NOTE — PROGRESS NOTES
Primary RN informed me that pt alone in room with three young children and baby. States baby's father dropped them off and she thought he would take them home but he didn't. She then stated she would leave if she couldn't keep the children in room.     I sp

## 2020-01-15 NOTE — PROGRESS NOTES
NURSING DISCHARGE NOTE    Discharged Home via Wheelchair. Accompanied by daughters  Belongings Taken by patient/family. Verbalized understanding of discharge instructions.

## 2020-01-16 ENCOUNTER — TELEPHONE (OUTPATIENT)
Dept: OBGYN UNIT | Facility: HOSPITAL | Age: 30
End: 2020-01-16

## 2020-01-16 NOTE — PROGRESS NOTES
Reviewed self and infant care w / mom, she verbalizes understanding of instructions reviewed. Encourage to follow up w/ MDs as directed and w/ questions/concerns.  Jackie w her boyfriend for support but he works 2 jobs, oldest if only 6 years, enc to rest whe

## 2020-01-21 ENCOUNTER — TELEPHONE (OUTPATIENT)
Dept: OBGYN CLINIC | Facility: CLINIC | Age: 30
End: 2020-01-21

## 2020-01-27 ENCOUNTER — OFFICE VISIT (OUTPATIENT)
Dept: OBGYN CLINIC | Facility: CLINIC | Age: 30
End: 2020-01-27
Payer: COMMERCIAL

## 2020-01-27 VITALS
HEART RATE: 85 BPM | HEIGHT: 65 IN | SYSTOLIC BLOOD PRESSURE: 100 MMHG | BODY MASS INDEX: 25.02 KG/M2 | WEIGHT: 150.19 LBS | DIASTOLIC BLOOD PRESSURE: 70 MMHG

## 2020-01-27 LAB
AMB EXT GLUCOSE: 95 MG/DL
AMB EXT HEMATOCRIT: 43.8
AMB EXT HEMOGLOBIN: 14.7
AMB EXT MCV: 92
AMB EXT PLATELETS: 220
AMB EXT WBC: 8.5 X10(3)UL

## 2020-01-27 PROCEDURE — 99213 OFFICE O/P EST LOW 20 MIN: CPT | Performed by: OBSTETRICS & GYNECOLOGY

## 2020-01-27 RX ORDER — AMOXICILLIN AND CLAVULANATE POTASSIUM 500; 125 MG/1; MG/1
1 TABLET, FILM COATED ORAL 3 TIMES DAILY
Qty: 21 TABLET | Refills: 0 | Status: SHIPPED | OUTPATIENT
Start: 2020-01-27 | End: 2020-02-24

## 2020-01-27 RX ORDER — METHYLERGONOVINE MALEATE 0.2 MG/1
0.2 TABLET ORAL 3 TIMES DAILY
Qty: 6 TABLET | Refills: 0 | Status: SHIPPED | OUTPATIENT
Start: 2020-01-27 | End: 2020-01-29

## 2020-01-27 NOTE — PROGRESS NOTES
Sharon Brown is a 30year old female [id number] Patient's last menstrual period was 2019. Patient presents with: Other: pp problem, vaginal bleeding, u/s shows a piece of placenta is in   . Patient had  2 weeks ago, states her bleeding almos Laterality Date   • Tonsillectomy     • Jackson teeth removed         SOCIAL HISTORY:  Social History    Socioeconomic History      Marital status: Single      Spouse name: Not on file      Number of children: Not on file      Years of education: Not on douglas Grandmother    • No Known Problems Maternal Grandfather    • No Known Problems Paternal Grandmother    • No Known Problems Paternal Grandfather    • No Known Problems Daughter    • No Known Problems Daughter    • Other Cinthya Saint Anthony Syndrome - random e

## 2020-02-10 ENCOUNTER — TELEPHONE (OUTPATIENT)
Dept: OBGYN CLINIC | Facility: CLINIC | Age: 30
End: 2020-02-10

## 2020-02-24 ENCOUNTER — POSTPARTUM (OUTPATIENT)
Dept: OBGYN CLINIC | Facility: CLINIC | Age: 30
End: 2020-02-24
Payer: COMMERCIAL

## 2020-02-24 VITALS
DIASTOLIC BLOOD PRESSURE: 70 MMHG | HEART RATE: 90 BPM | HEIGHT: 65 IN | SYSTOLIC BLOOD PRESSURE: 110 MMHG | BODY MASS INDEX: 24.76 KG/M2 | WEIGHT: 148.63 LBS

## 2020-02-24 DIAGNOSIS — Z30.49 ENCOUNTER FOR SURVEILLANCE OF OTHER CONTRACEPTIVE: ICD-10-CM

## 2020-02-24 PROBLEM — O09.293 HX MATERNAL GBS (GROUP B STREPTOCOCCUS) AFFECTED NEONATE, PREGNANT, THIRD TRIMESTER: Status: RESOLVED | Noted: 2020-01-08 | Resolved: 2020-02-24

## 2020-02-24 PROBLEM — Z34.93 PREGNANCY WITH PRENATAL CARE ELSEWHERE IN THIRD TRIMESTER: Status: RESOLVED | Noted: 2020-01-06 | Resolved: 2020-02-24

## 2020-02-24 LAB — CONTROL LINE PRESENT WITH A CLEAR BACKGROUND (YES/NO): YES YES/NO

## 2020-02-24 PROCEDURE — 81025 URINE PREGNANCY TEST: CPT | Performed by: OBSTETRICS & GYNECOLOGY

## 2020-02-24 PROCEDURE — 96372 THER/PROPH/DIAG INJ SC/IM: CPT | Performed by: OBSTETRICS & GYNECOLOGY

## 2020-02-24 RX ORDER — ACETAMINOPHEN AND CODEINE PHOSPHATE 120; 12 MG/5ML; MG/5ML
0.35 SOLUTION ORAL DAILY
Qty: 84 TABLET | Refills: 1 | Status: SHIPPED | OUTPATIENT
Start: 2020-02-24 | End: 2020-03-23

## 2020-02-24 RX ORDER — MEDROXYPROGESTERONE ACETATE 150 MG/ML
150 INJECTION, SUSPENSION INTRAMUSCULAR ONCE
Status: COMPLETED | OUTPATIENT
Start: 2020-02-24 | End: 2020-02-24

## 2020-02-24 RX ADMIN — MEDROXYPROGESTERONE ACETATE 150 MG: 150 INJECTION, SUSPENSION INTRAMUSCULAR at 13:27:00

## 2020-02-24 NOTE — PROGRESS NOTES
IDA Tinoco is a 30year old female [id number] here for 6 week post-partum visit. Patient delivered a  male infant on 20 via . Patient desires OCP for contraception. Patient is breast feeding.    Patient denies symptoms of depressi weight 148 lb 9.6 oz (67.4 kg), last menstrual period 02/22/2020, currently breastfeeding.   General:  Well nourished, well developed woman in no acute distress  Abdomen:  soft, nontender, no masses  External Genitalia: normal appearance, hair distribution,

## 2020-09-15 RX ORDER — ACETAMINOPHEN AND CODEINE PHOSPHATE 120; 12 MG/5ML; MG/5ML
SOLUTION ORAL
Qty: 84 TABLET | Refills: 1 | OUTPATIENT
Start: 2020-09-15

## 2022-03-23 ENCOUNTER — OFFICE VISIT (OUTPATIENT)
Dept: OBGYN CLINIC | Facility: CLINIC | Age: 32
End: 2022-03-23
Payer: COMMERCIAL

## 2022-03-23 VITALS
SYSTOLIC BLOOD PRESSURE: 121 MMHG | HEIGHT: 65 IN | BODY MASS INDEX: 22.39 KG/M2 | WEIGHT: 134.38 LBS | DIASTOLIC BLOOD PRESSURE: 78 MMHG | HEART RATE: 83 BPM

## 2022-03-23 DIAGNOSIS — N92.6 IRREGULAR BLEEDING: Primary | ICD-10-CM

## 2022-03-23 PROCEDURE — 3078F DIAST BP <80 MM HG: CPT | Performed by: OBSTETRICS & GYNECOLOGY

## 2022-03-23 PROCEDURE — 3074F SYST BP LT 130 MM HG: CPT | Performed by: OBSTETRICS & GYNECOLOGY

## 2022-03-23 PROCEDURE — 3008F BODY MASS INDEX DOCD: CPT | Performed by: OBSTETRICS & GYNECOLOGY

## 2022-03-23 PROCEDURE — 99213 OFFICE O/P EST LOW 20 MIN: CPT | Performed by: OBSTETRICS & GYNECOLOGY

## 2022-03-23 RX ORDER — METHYLPHENIDATE HYDROCHLORIDE 54 MG/1
TABLET ORAL
COMMUNITY
Start: 2022-03-07

## 2022-03-23 RX ORDER — DROSPIRENONE 4 MG/1
1 TABLET, FILM COATED ORAL DAILY
Qty: 84 TABLET | Refills: 1 | Status: SHIPPED | OUTPATIENT
Start: 2022-03-23 | End: 2023-03-23

## 2022-03-23 RX ORDER — METHYLPHENIDATE HYDROCHLORIDE 18 MG/1
TABLET ORAL
COMMUNITY
Start: 2022-01-19 | End: 2022-03-23

## 2022-03-23 NOTE — TELEPHONE ENCOUNTER
Spoke with pt. I let her know her insurance doesn't cover Slynd. I told her we can try [address] or we can send in a different BCP that is covered. She would rather have a different BCP called in. She also wanted me to check with Dr. Fe Flowers to see if Depo would be an option for her since she is on Concerta. I let her know I would get a message to Dr. Fe Flowers and call her back with recommendations. Verbalized understanding.     Preferred alternative's- Negrita Marx Heather, Norethindrone, Deblitane, & [employer]

## 2022-03-24 RX ORDER — ACETAMINOPHEN AND CODEINE PHOSPHATE 120; 12 MG/5ML; MG/5ML
0.35 SOLUTION ORAL DAILY
Qty: 84 TABLET | Refills: 1 | Status: SHIPPED | OUTPATIENT
Start: 2022-03-24 | End: 2022-09-08

## 2022-03-24 NOTE — TELEPHONE ENCOUNTER
Spoke with pt and let her know Dr. Aiden Norton sent her rx to her pharmacy. Verbalized understanding.

## 2022-03-30 ENCOUNTER — ULTRASOUND ENCOUNTER (OUTPATIENT)
Dept: OBGYN CLINIC | Facility: CLINIC | Age: 32
End: 2022-03-30
Payer: COMMERCIAL

## 2022-11-02 ENCOUNTER — HOSPITAL ENCOUNTER (EMERGENCY)
Facility: HOSPITAL | Age: 32
Discharge: LEFT BEFORE DISCHARGE | End: 2022-11-02
Payer: COMMERCIAL

## 2022-11-02 VITALS
OXYGEN SATURATION: 100 % | WEIGHT: 150 LBS | BODY MASS INDEX: 24.99 KG/M2 | HEIGHT: 65 IN | DIASTOLIC BLOOD PRESSURE: 73 MMHG | TEMPERATURE: 99 F | HEART RATE: 77 BPM | RESPIRATION RATE: 16 BRPM | SYSTOLIC BLOOD PRESSURE: 116 MMHG

## 2022-11-02 RX ORDER — SERTRALINE HYDROCHLORIDE 25 MG/1
25 TABLET, FILM COATED ORAL DAILY
COMMUNITY

## 2022-11-02 NOTE — ED INITIAL ASSESSMENT (HPI)
Patient here with very severe back pain since last Thursday. Patient had a back injury a couple months ago and does a lot of heavy lifting. 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

## 2023-04-27 ENCOUNTER — APPOINTMENT (OUTPATIENT)
Dept: MRI IMAGING | Facility: HOSPITAL | Age: 33
End: 2023-04-27
Attending: EMERGENCY MEDICINE
Payer: COMMERCIAL

## 2023-04-27 ENCOUNTER — APPOINTMENT (OUTPATIENT)
Dept: GENERAL RADIOLOGY | Facility: HOSPITAL | Age: 33
End: 2023-04-27
Payer: COMMERCIAL

## 2023-04-27 ENCOUNTER — HOSPITAL ENCOUNTER (EMERGENCY)
Facility: HOSPITAL | Age: 33
Discharge: HOME | End: 2023-04-27
Attending: EMERGENCY MEDICINE
Payer: COMMERCIAL

## 2023-04-27 VITALS
RESPIRATION RATE: 17 BRPM | DIASTOLIC BLOOD PRESSURE: 76 MMHG | HEART RATE: 76 BPM | TEMPERATURE: 98 F | SYSTOLIC BLOOD PRESSURE: 110 MMHG | OXYGEN SATURATION: 100 %

## 2023-04-27 DIAGNOSIS — R07.89 CHEST PAIN, ATYPICAL: Primary | ICD-10-CM

## 2023-04-27 LAB
ALBUMIN SERPL-MCNC: 4 G/DL (ref 3.4–5)
ALBUMIN/GLOB SERPL: 1.2 {RATIO} (ref 1–2)
ALP LIVER SERPL-CCNC: 63 U/L
ALT SERPL-CCNC: 27 U/L
ANION GAP SERPL CALC-SCNC: 0 MMOL/L (ref 0–18)
AST SERPL-CCNC: 14 U/L (ref 15–37)
BASOPHILS # BLD AUTO: 0.03 X10(3) UL (ref 0–0.2)
BASOPHILS NFR BLD AUTO: 0.5 %
BILIRUB SERPL-MCNC: 0.5 MG/DL (ref 0.1–2)
BUN BLD-MCNC: 11 MG/DL (ref 7–18)
CALCIUM BLD-MCNC: 9 MG/DL (ref 8.5–10.1)
CHLORIDE SERPL-SCNC: 110 MMOL/L (ref 98–112)
CO2 SERPL-SCNC: 27 MMOL/L (ref 21–32)
CREAT BLD-MCNC: 0.9 MG/DL
D DIMER PPP FEU-MCNC: 0.28 UG/ML FEU (ref ?–0.5)
EOSINOPHIL # BLD AUTO: 0.12 X10(3) UL (ref 0–0.7)
EOSINOPHIL NFR BLD AUTO: 1.9 %
ERYTHROCYTE [DISTWIDTH] IN BLOOD BY AUTOMATED COUNT: 12.9 %
GFR SERPLBLD BASED ON 1.73 SQ M-ARVRAT: 87 ML/MIN/1.73M2 (ref 60–?)
GLOBULIN PLAS-MCNC: 3.4 G/DL (ref 2.8–4.4)
GLUCOSE BLD-MCNC: 93 MG/DL (ref 70–99)
HCT VFR BLD AUTO: 39.5 %
HGB BLD-MCNC: 13.5 G/DL
IMM GRANULOCYTES # BLD AUTO: 0.01 X10(3) UL (ref 0–1)
IMM GRANULOCYTES NFR BLD: 0.2 %
LYMPHOCYTES # BLD AUTO: 1.9 X10(3) UL (ref 1–4)
LYMPHOCYTES NFR BLD AUTO: 30.4 %
MCH RBC QN AUTO: 31.5 PG (ref 26–34)
MCHC RBC AUTO-ENTMCNC: 34.2 G/DL (ref 31–37)
MCV RBC AUTO: 92.1 FL
MONOCYTES # BLD AUTO: 0.42 X10(3) UL (ref 0.1–1)
MONOCYTES NFR BLD AUTO: 6.7 %
NEUTROPHILS # BLD AUTO: 3.78 X10 (3) UL (ref 1.5–7.7)
NEUTROPHILS # BLD AUTO: 3.78 X10(3) UL (ref 1.5–7.7)
NEUTROPHILS NFR BLD AUTO: 60.3 %
OSMOLALITY SERPL CALC.SUM OF ELEC: 283 MOSM/KG (ref 275–295)
PLATELET # BLD AUTO: 210 10(3)UL (ref 150–450)
POTASSIUM SERPL-SCNC: 3.5 MMOL/L (ref 3.5–5.1)
PROT SERPL-MCNC: 7.4 G/DL (ref 6.4–8.2)
RBC # BLD AUTO: 4.29 X10(6)UL
SODIUM SERPL-SCNC: 137 MMOL/L (ref 136–145)
TROPONIN I HIGH SENSITIVITY: 4 NG/L
WBC # BLD AUTO: 6.3 X10(3) UL (ref 4–11)

## 2023-04-27 PROCEDURE — 84484 ASSAY OF TROPONIN QUANT: CPT

## 2023-04-27 PROCEDURE — 71045 X-RAY EXAM CHEST 1 VIEW: CPT | Performed by: EMERGENCY MEDICINE

## 2023-04-27 PROCEDURE — 80053 COMPREHEN METABOLIC PANEL: CPT | Performed by: EMERGENCY MEDICINE

## 2023-04-27 PROCEDURE — 36415 COLL VENOUS BLD VENIPUNCTURE: CPT

## 2023-04-27 PROCEDURE — 93010 ELECTROCARDIOGRAM REPORT: CPT

## 2023-04-27 PROCEDURE — 93005 ELECTROCARDIOGRAM TRACING: CPT

## 2023-04-27 PROCEDURE — 99285 EMERGENCY DEPT VISIT HI MDM: CPT

## 2023-04-27 PROCEDURE — 85379 FIBRIN DEGRADATION QUANT: CPT | Performed by: EMERGENCY MEDICINE

## 2023-04-27 PROCEDURE — 84484 ASSAY OF TROPONIN QUANT: CPT | Performed by: EMERGENCY MEDICINE

## 2023-04-27 PROCEDURE — 70551 MRI BRAIN STEM W/O DYE: CPT | Performed by: EMERGENCY MEDICINE

## 2023-04-27 PROCEDURE — 80053 COMPREHEN METABOLIC PANEL: CPT

## 2023-04-27 PROCEDURE — 85025 COMPLETE CBC W/AUTO DIFF WBC: CPT | Performed by: EMERGENCY MEDICINE

## 2023-04-27 PROCEDURE — 85025 COMPLETE CBC W/AUTO DIFF WBC: CPT

## 2023-04-27 NOTE — ED INITIAL ASSESSMENT (HPI)
A&Ox3 ambulatory patient p/w central chest heaviness radiating to LUE for the past week    +SHAH, +LH/dizziness, intermittent \"sharp spasms\" as well    Patient states hx of \"mild Afib\" but is currently not on any medication for it    RR even/NL, speaking in full clear sentences

## 2023-04-28 LAB
ATRIAL RATE: 93 BPM
P AXIS: 7 DEGREES
P-R INTERVAL: 150 MS
Q-T INTERVAL: 350 MS
QRS DURATION: 78 MS
QTC CALCULATION (BEZET): 435 MS
R AXIS: 69 DEGREES
T AXIS: 3 DEGREES
VENTRICULAR RATE: 93 BPM

## 2023-04-28 NOTE — DISCHARGE INSTRUCTIONS
Please follow-up with a cardiologist in the next 1 to 2 weeks for further evaluation. Return for any worsening pain, difficulty breathing, or any concerning symptoms.

## 2023-05-03 ENCOUNTER — APPOINTMENT (OUTPATIENT)
Dept: GENERAL RADIOLOGY | Facility: HOSPITAL | Age: 33
End: 2023-05-03
Attending: EMERGENCY MEDICINE
Payer: COMMERCIAL

## 2023-05-03 ENCOUNTER — HOSPITAL ENCOUNTER (EMERGENCY)
Facility: HOSPITAL | Age: 33
Discharge: HOME | End: 2023-05-03
Attending: EMERGENCY MEDICINE
Payer: COMMERCIAL

## 2023-05-03 VITALS
BODY MASS INDEX: 26.66 KG/M2 | SYSTOLIC BLOOD PRESSURE: 121 MMHG | WEIGHT: 160 LBS | RESPIRATION RATE: 14 BRPM | HEART RATE: 76 BPM | HEIGHT: 65 IN | TEMPERATURE: 98 F | DIASTOLIC BLOOD PRESSURE: 79 MMHG | OXYGEN SATURATION: 100 %

## 2023-05-03 DIAGNOSIS — G44.89 OTHER HEADACHE SYNDROME: ICD-10-CM

## 2023-05-03 DIAGNOSIS — R07.89 CHEST WALL PAIN: Primary | ICD-10-CM

## 2023-05-03 DIAGNOSIS — H81.13 BENIGN PAROXYSMAL POSITIONAL VERTIGO DUE TO BILATERAL VESTIBULAR DISORDER: ICD-10-CM

## 2023-05-03 LAB
ATRIAL RATE: 82 BPM
B-HCG UR QL: NEGATIVE
BILIRUB UR QL STRIP.AUTO: NEGATIVE
COLOR UR AUTO: YELLOW
GLUCOSE UR STRIP.AUTO-MCNC: NEGATIVE MG/DL
KETONES UR STRIP.AUTO-MCNC: NEGATIVE MG/DL
NITRITE UR QL STRIP.AUTO: NEGATIVE
P AXIS: 10 DEGREES
P-R INTERVAL: 170 MS
PH UR STRIP.AUTO: 7 [PH] (ref 5–8)
PROT UR STRIP.AUTO-MCNC: NEGATIVE MG/DL
Q-T INTERVAL: 366 MS
QRS DURATION: 80 MS
QTC CALCULATION (BEZET): 427 MS
R AXIS: 60 DEGREES
RBC UR QL AUTO: NEGATIVE
SP GR UR STRIP.AUTO: 1.02 (ref 1–1.03)
T AXIS: -7 DEGREES
TROPONIN I HIGH SENSITIVITY: <3 NG/L
UROBILINOGEN UR STRIP.AUTO-MCNC: 2 MG/DL
VENTRICULAR RATE: 82 BPM

## 2023-05-03 PROCEDURE — 87086 URINE CULTURE/COLONY COUNT: CPT | Performed by: EMERGENCY MEDICINE

## 2023-05-03 PROCEDURE — 81025 URINE PREGNANCY TEST: CPT

## 2023-05-03 PROCEDURE — 99284 EMERGENCY DEPT VISIT MOD MDM: CPT

## 2023-05-03 PROCEDURE — 84484 ASSAY OF TROPONIN QUANT: CPT | Performed by: EMERGENCY MEDICINE

## 2023-05-03 PROCEDURE — 81001 URINALYSIS AUTO W/SCOPE: CPT | Performed by: EMERGENCY MEDICINE

## 2023-05-03 PROCEDURE — 93010 ELECTROCARDIOGRAM REPORT: CPT

## 2023-05-03 PROCEDURE — 71045 X-RAY EXAM CHEST 1 VIEW: CPT | Performed by: EMERGENCY MEDICINE

## 2023-05-03 PROCEDURE — 36415 COLL VENOUS BLD VENIPUNCTURE: CPT

## 2023-05-03 PROCEDURE — 93005 ELECTROCARDIOGRAM TRACING: CPT

## 2023-05-03 RX ORDER — MECLIZINE HYDROCHLORIDE 25 MG/1
25 TABLET ORAL ONCE
Status: COMPLETED | OUTPATIENT
Start: 2023-05-03 | End: 2023-05-03

## 2023-05-03 RX ORDER — IBUPROFEN 600 MG/1
600 TABLET ORAL EVERY 8 HOURS PRN
Qty: 30 TABLET | Refills: 0 | Status: SHIPPED | OUTPATIENT
Start: 2023-05-03 | End: 2023-05-10

## 2023-05-03 RX ORDER — MECLIZINE HYDROCHLORIDE 25 MG/1
25 TABLET ORAL 3 TIMES DAILY PRN
Qty: 30 TABLET | Refills: 0 | Status: SHIPPED | OUTPATIENT
Start: 2023-05-03

## 2023-05-03 NOTE — ED INITIAL ASSESSMENT (HPI)
Pt here due to chest pain, dizziness and jerking. Pt was seen here last Thursday for the same thing.

## 2023-05-04 ENCOUNTER — TELEPHONE (OUTPATIENT)
Dept: PHYSICAL THERAPY | Facility: HOSPITAL | Age: 33
End: 2023-05-04

## 2023-05-05 ENCOUNTER — OFFICE VISIT (OUTPATIENT)
Dept: PHYSICAL THERAPY | Facility: HOSPITAL | Age: 33
End: 2023-05-05
Attending: EMERGENCY MEDICINE
Payer: COMMERCIAL

## 2023-05-05 DIAGNOSIS — H81.13 BENIGN PAROXYSMAL POSITIONAL VERTIGO DUE TO BILATERAL VESTIBULAR DISORDER: ICD-10-CM

## 2023-05-05 PROCEDURE — 97112 NEUROMUSCULAR REEDUCATION: CPT

## 2023-05-05 PROCEDURE — 97163 PT EVAL HIGH COMPLEX 45 MIN: CPT

## 2023-05-08 ENCOUNTER — OFFICE VISIT (OUTPATIENT)
Dept: PHYSICAL THERAPY | Facility: HOSPITAL | Age: 33
End: 2023-05-08
Attending: EMERGENCY MEDICINE
Payer: COMMERCIAL

## 2023-05-08 PROCEDURE — 97140 MANUAL THERAPY 1/> REGIONS: CPT

## 2023-05-08 PROCEDURE — 97110 THERAPEUTIC EXERCISES: CPT

## 2023-05-08 NOTE — PROGRESS NOTES
Diagnosis: Dizziness, Post concussion syndrome      Referring Provider: Mary Whittaker  Date of Evaluation:    5/5/23    Precautions:  None Next MD visit:   none scheduled  Date of Surgery: n/a   Insurance Primary/Secondary: [address] [place] [address] / N/A     # Auth Visits: 16 per POC            Subjective: Patient states that she had increased HA after evaluation on Friday, improved back to baseline over the weekend, but HA and cervical pain is notably increased today again. Dizziness has been about the same. She tried doing HEP and it went ok, no notable issues. She tried to walk-in to see a PCP today, but would not take walk-ins, so she is working on scheduling an appointment. Pain: 5/10 HA; 7/10 neck pain       Objective:   (5/8/23):  Cervical retraction: able to appropriately recruit with towel roll behind; without roll, tendency to fall into upper cervical extension and unable to recruit    Palpation: trigger pt R SOs, palpation resulting in increased HA      Assessment: Patient with fair-tolerance to tx this date. Good response to light STM cervical paraspinals, SOs, and scalenes; increased mm tension R as compared to L throughout. Patient with palpable trigger pt R SOs resulting in increased HA to palpation, but able to improve with STM to R scalenes. PROM rotation and SB performed with assisted upper cervical rotation only, otherwise increasing HA. Initiated cervical retraction on towel roll with pillow, appropriate recruitment, without towel roll, falls into upper cervical extension and cannot recruit. Attempted to initiate light mobility exercise as listed, HA and cervical pain quick to increase, patient requested to end session at that time after resting due to fatigue. Patient denied ice or heat end of session. Goals: (To be met in 16 visits)  1. Patient will improve cervical rotation AROM to 70 deg or better without pain to improve ability to look over shoulders when driving.    2. Patient will demonstrate compensation of hypometric saccades. 3. Patient will score 30/30 on the FGA to be considered at low risk for falls at home and in the community and allow her to return to home exercise routine. 4. Patient will demonstrate normal gains in VOR x 1 and VOR x 2.   5. Patient will be IND and compliant in HEP to maintain progress made in PT     Plan: Continue PT with progression as indicated. Date: 5/8/2023  TX#: 2/16 Date:                 TX#: 3/ Date:                 TX#: 4/ Date:                 TX#: 5/ Date:    Tx#: 6/   Manual  - STM SOs, middle and posterior scalenes, LS; FELIPA, but primarily R sided   - light PROM rotation and SB FELIPA with upper cervical assist          Therex   - UBE L1 x 3 min retro  - supine cervical retraction with towel roll on inclined table 2 x 5  - doorway pec stretch 3 x 20 sec   - wall angels within available range x 10  - attempted cervical rotation iso with ball on wall, stopped due to pain  - supine cervical rotation on pillow x 5 R/L, towel roll under neck       -       -       HEP: saccades, pencil push-ups, UT stretch, shoulder rolls, scap retract     Charges: Manual x 1, Therex x 1       Total Timed Treatment: 33 min  Total Treatment Time: 33 min (ended early due to fatigue)

## 2023-05-09 ENCOUNTER — OFFICE VISIT (OUTPATIENT)
Dept: FAMILY MEDICINE CLINIC | Facility: CLINIC | Age: 33
End: 2023-05-09
Payer: COMMERCIAL

## 2023-05-09 DIAGNOSIS — Z76.89 RETURN TO WORK EVALUATION: ICD-10-CM

## 2023-05-09 DIAGNOSIS — R42 VERTIGO: Primary | ICD-10-CM

## 2023-05-09 NOTE — PROGRESS NOTES
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 Advised patient unable to clear in [facility], advised would need to be seen at occupational health or by PCP for paperwork. Information given to patient for locations. Able to proceed to options for clearance. No charge for visit.

## 2023-05-10 ENCOUNTER — APPOINTMENT (OUTPATIENT)
Dept: PHYSICAL THERAPY | Facility: HOSPITAL | Age: 33
End: 2023-05-10
Attending: EMERGENCY MEDICINE
Payer: COMMERCIAL

## 2023-05-11 ENCOUNTER — OFFICE VISIT (OUTPATIENT)
Dept: FAMILY MEDICINE CLINIC | Facility: CLINIC | Age: 33
End: 2023-05-11
Payer: COMMERCIAL

## 2023-05-11 VITALS
WEIGHT: 163 LBS | DIASTOLIC BLOOD PRESSURE: 68 MMHG | BODY MASS INDEX: 27.16 KG/M2 | HEIGHT: 65 IN | SYSTOLIC BLOOD PRESSURE: 110 MMHG | HEART RATE: 91 BPM | OXYGEN SATURATION: 94 %

## 2023-05-11 DIAGNOSIS — R06.09 DYSPNEA ON EXERTION: ICD-10-CM

## 2023-05-11 DIAGNOSIS — R42 VERTIGO: Primary | ICD-10-CM

## 2023-05-11 PROCEDURE — 3078F DIAST BP <80 MM HG: CPT | Performed by: STUDENT IN AN ORGANIZED HEALTH CARE EDUCATION/TRAINING PROGRAM

## 2023-05-11 PROCEDURE — 3074F SYST BP LT 130 MM HG: CPT | Performed by: STUDENT IN AN ORGANIZED HEALTH CARE EDUCATION/TRAINING PROGRAM

## 2023-05-11 PROCEDURE — 3008F BODY MASS INDEX DOCD: CPT | Performed by: STUDENT IN AN ORGANIZED HEALTH CARE EDUCATION/TRAINING PROGRAM

## 2023-05-11 PROCEDURE — 99203 OFFICE O/P NEW LOW 30 MIN: CPT | Performed by: STUDENT IN AN ORGANIZED HEALTH CARE EDUCATION/TRAINING PROGRAM

## 2023-05-23 ENCOUNTER — APPOINTMENT (OUTPATIENT)
Dept: PHYSICAL THERAPY | Facility: HOSPITAL | Age: 33
End: 2023-05-23
Attending: EMERGENCY MEDICINE
Payer: COMMERCIAL

## 2023-05-23 ENCOUNTER — TELEPHONE (OUTPATIENT)
Dept: PHYSICAL THERAPY | Facility: HOSPITAL | Age: 33
End: 2023-05-23

## 2023-05-23 NOTE — TELEPHONE ENCOUNTER
Attempted to call patient after NC/NS to PT. Patient did not answer and VM not set up. Did send her a [employer] message offering other appointment times and reminding of next scheduled.

## 2023-05-30 ENCOUNTER — OFFICE VISIT (OUTPATIENT)
Dept: NEUROLOGY | Facility: CLINIC | Age: 33
End: 2023-05-30
Payer: COMMERCIAL

## 2023-05-30 VITALS
HEIGHT: 65 IN | SYSTOLIC BLOOD PRESSURE: 119 MMHG | OXYGEN SATURATION: 98 % | DIASTOLIC BLOOD PRESSURE: 71 MMHG | WEIGHT: 170 LBS | BODY MASS INDEX: 28.32 KG/M2 | HEART RATE: 81 BPM | RESPIRATION RATE: 18 BRPM

## 2023-05-30 DIAGNOSIS — H53.2 DOUBLE VISION: ICD-10-CM

## 2023-05-30 DIAGNOSIS — H93.13 TINNITUS OF BOTH EARS: ICD-10-CM

## 2023-05-30 DIAGNOSIS — R51.9 CHRONIC DAILY HEADACHE: ICD-10-CM

## 2023-05-30 DIAGNOSIS — G43.109 VERTEBRO-BASILAR MIGRAINE: ICD-10-CM

## 2023-05-30 DIAGNOSIS — F07.81 POST CONCUSSIVE SYNDROME: ICD-10-CM

## 2023-05-30 DIAGNOSIS — G43.909 EPISODIC MIGRAINE: ICD-10-CM

## 2023-05-30 DIAGNOSIS — R42 VERTIGO: Primary | ICD-10-CM

## 2023-05-30 PROCEDURE — 99204 OFFICE O/P NEW MOD 45 MIN: CPT | Performed by: OTHER

## 2023-05-30 PROCEDURE — 3078F DIAST BP <80 MM HG: CPT | Performed by: OTHER

## 2023-05-30 PROCEDURE — 3074F SYST BP LT 130 MM HG: CPT | Performed by: OTHER

## 2023-05-30 PROCEDURE — 3008F BODY MASS INDEX DOCD: CPT | Performed by: OTHER

## 2023-05-30 RX ORDER — UBROGEPANT 50 MG/1
TABLET ORAL
Qty: 10 TABLET | Refills: 0 | Status: SHIPPED | OUTPATIENT
Start: 2023-05-30 | End: 2024-05-29

## 2023-05-30 RX ORDER — AMITRIPTYLINE HYDROCHLORIDE 10 MG/1
TABLET, FILM COATED ORAL
Qty: 60 TABLET | Refills: 2 | Status: SHIPPED | OUTPATIENT
Start: 2023-05-30

## 2023-05-30 NOTE — PATIENT INSTRUCTIONS
Please start taking amitriptyline as follows for migraine / headache prevention: start at 10 mg ( 1 pill) nightly at bedtime x1 week, then increase to 20 mg nightly and continue this dose; monitor for potential side effects including but not limited to excessive sedation (drowsiness), dry mouth, constipation / GI discomfort and/or weight gain; let office know of any concerns    For when migraines, occur, take ubrogepantt Shamika Lockett) :  take 50 mg  within first 30 minutes of symptoms starting; if not improved after 2 hours, take additional dose, and then stop taking; monitor for potential side effects, including but not limited to nausea, drowsiness and dry mouth    Have CTA brain/carotids done; have blood work done and have EEG done    Follow up in ~ 3 months or sooner as needed

## 2023-05-31 ENCOUNTER — APPOINTMENT (OUTPATIENT)
Dept: PHYSICAL THERAPY | Facility: HOSPITAL | Age: 33
End: 2023-05-31
Attending: EMERGENCY MEDICINE
Payer: COMMERCIAL

## 2023-05-31 ENCOUNTER — TELEPHONE (OUTPATIENT)
Dept: PHYSICAL THERAPY | Facility: HOSPITAL | Age: 33
End: 2023-05-31

## 2023-05-31 NOTE — TELEPHONE ENCOUNTER
LVM to f/u with patient after NC/NS to PT, will also send [employer] message to remind of next scheduled and advised of cancel/NS policy.

## 2023-06-01 ENCOUNTER — TELEPHONE (OUTPATIENT)
Dept: NEUROLOGY | Facility: CLINIC | Age: 33
End: 2023-06-01

## 2023-06-02 ENCOUNTER — TELEPHONE (OUTPATIENT)
Dept: NEUROLOGY | Facility: CLINIC | Age: 33
End: 2023-06-02

## 2023-06-02 ENCOUNTER — PATIENT MESSAGE (OUTPATIENT)
Dept: NEUROLOGY | Facility: CLINIC | Age: 33
End: 2023-06-02

## 2023-06-02 NOTE — TELEPHONE ENCOUNTER
Patient to drop off or upload (MyChart) specific [employer] document regarding work limitations and medication administration.
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
## 2023-06-05 ENCOUNTER — TELEPHONE (OUTPATIENT)
Dept: NEUROLOGY | Facility: CLINIC | Age: 33
End: 2023-06-05

## 2023-06-05 NOTE — TELEPHONE ENCOUNTER
Patient walked in and dropped off forms to be filled out. Per patient, please call back when forms are ready to be picked up.

## 2023-06-05 NOTE — TELEPHONE ENCOUNTER
Received fax from [address] [place] [address] Road received for patient use of [facility] [place]   Approval granted: 6/5/23-6/5/24        Pt notified

## 2023-06-09 ENCOUNTER — TELEPHONE (OUTPATIENT)
Dept: PHYSICAL THERAPY | Facility: HOSPITAL | Age: 33
End: 2023-06-09

## 2023-06-09 ENCOUNTER — APPOINTMENT (OUTPATIENT)
Dept: PHYSICAL THERAPY | Facility: HOSPITAL | Age: 33
End: 2023-06-09
Attending: EMERGENCY MEDICINE
Payer: COMMERCIAL

## 2023-06-09 NOTE — TELEPHONE ENCOUNTER
Canceled patient's remaining PT visits due to multiple NS and failure to communicate with therapist. Multiple attempts to contact including [employer] and [employer] messages.

## 2023-06-12 ENCOUNTER — PATIENT MESSAGE (OUTPATIENT)
Dept: CASE MANAGEMENT | Age: 33
End: 2023-06-12

## 2023-06-12 ENCOUNTER — TELEPHONE (OUTPATIENT)
Dept: NEUROLOGY | Facility: CLINIC | Age: 33
End: 2023-06-12

## 2023-06-12 NOTE — TELEPHONE ENCOUNTER
Patient states Bird Heladio did not help with migraine, and made her dizzy and nauseous. Is feeling fine now. EEG is scheduled. CT scans pending authorization. B2 not completed yet. OV scheduled for 8/30/202  Patient to ask PCP for note for work. No further needs at this time.

## 2023-06-12 NOTE — TELEPHONE ENCOUNTER
Message below received via [address] (TE on 6/2/2023) on 6/9/@ 2572 when office closed. LM for patient for condition update.     Good afternoon I picked  up my ubrevly and took it as directed soon after my migraine became worse extreme  nausea and vomiting I was asked to leave work and see a doctor this was on the 7th on the 8th i couldnt get out of bed to be seen I had to force myself to get up to go to work so I wouldn't lose my job  I was extremely tired and weak to the point the onsiteam care team had me sleep in their office today isthe 9th and my migraine is unbearable still

## 2023-06-13 ENCOUNTER — HOSPITAL ENCOUNTER (OUTPATIENT)
Dept: CT IMAGING | Facility: HOSPITAL | Age: 33
Discharge: HOME | End: 2023-06-13
Attending: Other
Payer: COMMERCIAL

## 2023-06-13 DIAGNOSIS — H53.2 DOUBLE VISION: ICD-10-CM

## 2023-06-13 DIAGNOSIS — R51.9 CHRONIC DAILY HEADACHE: ICD-10-CM

## 2023-06-13 DIAGNOSIS — H93.13 TINNITUS OF BOTH EARS: ICD-10-CM

## 2023-06-13 DIAGNOSIS — R42 VERTIGO: ICD-10-CM

## 2023-06-13 LAB
CREAT BLD-MCNC: 0.9 MG/DL
GFR SERPLBLD BASED ON 1.73 SQ M-ARVRAT: 87 ML/MIN/1.73M2 (ref 60–?)

## 2023-06-13 PROCEDURE — 70496 CT ANGIOGRAPHY HEAD: CPT | Performed by: OTHER

## 2023-06-13 PROCEDURE — 70498 CT ANGIOGRAPHY NECK: CPT | Performed by: OTHER

## 2023-06-13 PROCEDURE — 82565 ASSAY OF CREATININE: CPT

## 2023-06-14 ENCOUNTER — NURSE ONLY (OUTPATIENT)
Dept: ELECTROPHYSIOLOGY | Facility: HOSPITAL | Age: 33
End: 2023-06-14
Attending: Other
Payer: COMMERCIAL

## 2023-06-14 DIAGNOSIS — H93.13 TINNITUS OF BOTH EARS: ICD-10-CM

## 2023-06-14 DIAGNOSIS — R42 VERTIGO: ICD-10-CM

## 2023-06-14 PROCEDURE — 95819 EEG AWAKE AND ASLEEP: CPT

## 2023-06-14 NOTE — TELEPHONE ENCOUNTER
Patient presented to office, checking to see if paperwork has been done. Noted not completed yet, please advise forms completion.

## 2023-06-16 ENCOUNTER — APPOINTMENT (OUTPATIENT)
Dept: PHYSICAL THERAPY | Facility: HOSPITAL | Age: 33
End: 2023-06-16
Attending: EMERGENCY MEDICINE
Payer: COMMERCIAL
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
## 2023-06-22 ENCOUNTER — APPOINTMENT (OUTPATIENT)
Dept: GENERAL RADIOLOGY | Age: 33
End: 2023-06-22
Attending: NURSE PRACTITIONER
Payer: COMMERCIAL

## 2023-06-22 ENCOUNTER — HOSPITAL ENCOUNTER (OUTPATIENT)
Age: 33
Discharge: HOME | End: 2023-06-22
Payer: COMMERCIAL

## 2023-06-22 ENCOUNTER — OFFICE VISIT (OUTPATIENT)
Dept: FAMILY MEDICINE CLINIC | Facility: CLINIC | Age: 33
End: 2023-06-22
Payer: COMMERCIAL

## 2023-06-22 VITALS
WEIGHT: 170 LBS | BODY MASS INDEX: 28.32 KG/M2 | OXYGEN SATURATION: 99 % | HEIGHT: 65 IN | TEMPERATURE: 98 F | HEART RATE: 83 BPM | SYSTOLIC BLOOD PRESSURE: 108 MMHG | RESPIRATION RATE: 16 BRPM | DIASTOLIC BLOOD PRESSURE: 72 MMHG

## 2023-06-22 VITALS
BODY MASS INDEX: 28.32 KG/M2 | OXYGEN SATURATION: 99 % | SYSTOLIC BLOOD PRESSURE: 116 MMHG | HEIGHT: 65 IN | TEMPERATURE: 98 F | WEIGHT: 170 LBS | RESPIRATION RATE: 18 BRPM | HEART RATE: 77 BPM | DIASTOLIC BLOOD PRESSURE: 71 MMHG

## 2023-06-22 DIAGNOSIS — M54.42 ACUTE LEFT-SIDED LOW BACK PAIN WITH LEFT-SIDED SCIATICA: ICD-10-CM

## 2023-06-22 DIAGNOSIS — M54.32 SCIATICA OF LEFT SIDE: Primary | ICD-10-CM

## 2023-06-22 DIAGNOSIS — S49.91XA INJURY OF RIGHT SHOULDER, INITIAL ENCOUNTER: ICD-10-CM

## 2023-06-22 DIAGNOSIS — M25.511 ACUTE PAIN OF RIGHT SHOULDER: Primary | ICD-10-CM
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
## 2023-06-22 PROCEDURE — 3078F DIAST BP <80 MM HG: CPT | Performed by: NURSE PRACTITIONER

## 2023-06-22 PROCEDURE — 72110 X-RAY EXAM L-2 SPINE 4/>VWS: CPT | Performed by: NURSE PRACTITIONER

## 2023-06-22 PROCEDURE — 99213 OFFICE O/P EST LOW 20 MIN: CPT | Performed by: NURSE PRACTITIONER

## 2023-06-22 PROCEDURE — 3008F BODY MASS INDEX DOCD: CPT | Performed by: NURSE PRACTITIONER

## 2023-06-22 PROCEDURE — 99214 OFFICE O/P EST MOD 30 MIN: CPT

## 2023-06-22 PROCEDURE — 73030 X-RAY EXAM OF SHOULDER: CPT | Performed by: NURSE PRACTITIONER

## 2023-06-22 PROCEDURE — 3074F SYST BP LT 130 MM HG: CPT | Performed by: NURSE PRACTITIONER

## 2023-06-22 RX ORDER — PREDNISONE 20 MG/1
40 TABLET ORAL DAILY
Qty: 10 TABLET | Refills: 0 | Status: SHIPPED | OUTPATIENT
Start: 2023-06-22 | End: 2023-06-27

## 2023-06-22 RX ORDER — ORPHENADRINE CITRATE 100 MG/1
100 TABLET, EXTENDED RELEASE ORAL 2 TIMES DAILY
Qty: 20 TABLET | Refills: 0 | Status: SHIPPED | OUTPATIENT
Start: 2023-06-22 | End: 2023-06-29

## 2023-06-22 NOTE — ED INITIAL ASSESSMENT (HPI)
Patient presents to IC from [facility] for further evaluation from a fall 3 days ago. She slid down 6 stairs on her back and now with right shoulder and left lower back pain with sciatica. Wants xrays. Having trouble grabbing with right hand. Right hand dominent.

## 2023-07-05 ENCOUNTER — OFFICE VISIT (OUTPATIENT)
Dept: FAMILY MEDICINE CLINIC | Facility: CLINIC | Age: 33
End: 2023-07-05
Payer: COMMERCIAL

## 2023-07-05 VITALS
DIASTOLIC BLOOD PRESSURE: 70 MMHG | BODY MASS INDEX: 29.49 KG/M2 | HEIGHT: 65 IN | OXYGEN SATURATION: 99 % | WEIGHT: 177 LBS | SYSTOLIC BLOOD PRESSURE: 108 MMHG | HEART RATE: 100 BPM

## 2023-07-05 DIAGNOSIS — H53.8 BLURRY VISION: ICD-10-CM

## 2023-07-05 DIAGNOSIS — G89.29 CHRONIC NONINTRACTABLE HEADACHE, UNSPECIFIED HEADACHE TYPE: Primary | ICD-10-CM

## 2023-07-05 DIAGNOSIS — R51.9 CHRONIC NONINTRACTABLE HEADACHE, UNSPECIFIED HEADACHE TYPE: Primary | ICD-10-CM

## 2023-07-05 PROCEDURE — 3008F BODY MASS INDEX DOCD: CPT | Performed by: STUDENT IN AN ORGANIZED HEALTH CARE EDUCATION/TRAINING PROGRAM

## 2023-07-05 PROCEDURE — 3078F DIAST BP <80 MM HG: CPT | Performed by: STUDENT IN AN ORGANIZED HEALTH CARE EDUCATION/TRAINING PROGRAM

## 2023-07-05 PROCEDURE — 99214 OFFICE O/P EST MOD 30 MIN: CPT | Performed by: STUDENT IN AN ORGANIZED HEALTH CARE EDUCATION/TRAINING PROGRAM

## 2023-07-05 PROCEDURE — 3074F SYST BP LT 130 MM HG: CPT | Performed by: STUDENT IN AN ORGANIZED HEALTH CARE EDUCATION/TRAINING PROGRAM

## 2023-07-05 RX ORDER — PROPRANOLOL HYDROCHLORIDE 10 MG/1
10 TABLET ORAL 2 TIMES DAILY
Qty: 60 TABLET | Refills: 0 | Status: SHIPPED | OUTPATIENT
Start: 2023-07-05 | End: 2023-08-04

## 2023-08-07 ENCOUNTER — OFFICE VISIT (OUTPATIENT)
Dept: FAMILY MEDICINE CLINIC | Facility: CLINIC | Age: 33
End: 2023-08-07
Payer: COMMERCIAL

## 2023-08-07 VITALS
HEART RATE: 92 BPM | SYSTOLIC BLOOD PRESSURE: 106 MMHG | BODY MASS INDEX: 28.54 KG/M2 | RESPIRATION RATE: 16 BRPM | WEIGHT: 173.38 LBS | HEIGHT: 65.5 IN | TEMPERATURE: 97 F | DIASTOLIC BLOOD PRESSURE: 68 MMHG

## 2023-08-07 DIAGNOSIS — G43.809 OTHER MIGRAINE WITHOUT STATUS MIGRAINOSUS, NOT INTRACTABLE: Primary | ICD-10-CM

## 2023-08-07 DIAGNOSIS — Z12.4 SCREENING FOR CERVICAL CANCER: ICD-10-CM

## 2023-08-07 DIAGNOSIS — Z71.6 ENCOUNTER FOR TOBACCO USE CESSATION COUNSELING: ICD-10-CM

## 2023-08-07 PROBLEM — Z34.90 PREGNANCY: Status: RESOLVED | Noted: 2019-10-22 | Resolved: 2023-08-07

## 2023-08-07 PROBLEM — O34.33 PREMATURE CERVICAL DILATION, THIRD TRIMESTER: Status: RESOLVED | Noted: 2020-01-06 | Resolved: 2023-08-07

## 2023-08-07 PROBLEM — R77.2 ELEVATED AFP: Status: RESOLVED | Noted: 2020-01-06 | Resolved: 2023-08-07

## 2023-08-07 PROCEDURE — 99214 OFFICE O/P EST MOD 30 MIN: CPT | Performed by: STUDENT IN AN ORGANIZED HEALTH CARE EDUCATION/TRAINING PROGRAM

## 2023-08-07 PROCEDURE — 3074F SYST BP LT 130 MM HG: CPT | Performed by: STUDENT IN AN ORGANIZED HEALTH CARE EDUCATION/TRAINING PROGRAM

## 2023-08-07 PROCEDURE — 3008F BODY MASS INDEX DOCD: CPT | Performed by: STUDENT IN AN ORGANIZED HEALTH CARE EDUCATION/TRAINING PROGRAM

## 2023-08-07 PROCEDURE — 87624 HPV HI-RISK TYP POOLED RSLT: CPT | Performed by: STUDENT IN AN ORGANIZED HEALTH CARE EDUCATION/TRAINING PROGRAM

## 2023-08-07 PROCEDURE — 3078F DIAST BP <80 MM HG: CPT | Performed by: STUDENT IN AN ORGANIZED HEALTH CARE EDUCATION/TRAINING PROGRAM

## 2023-08-07 RX ORDER — PROPRANOLOL HYDROCHLORIDE 10 MG/1
10 TABLET ORAL 2 TIMES DAILY
Qty: 180 TABLET | Refills: 3 | Status: SHIPPED | OUTPATIENT
Start: 2023-08-07 | End: 2024-08-06

## 2023-08-07 RX ORDER — NICOTINE 21-14-7MG
1 KIT TRANSDERMAL DAILY
Qty: 1 KIT | Refills: 0 | Status: SHIPPED | OUTPATIENT
Start: 2023-08-07

## 2023-08-08 LAB — HPV I/H RISK 1 DNA SPEC QL NAA+PROBE: NEGATIVE

## 2023-08-14 ENCOUNTER — TELEPHONE (OUTPATIENT)
Dept: FAMILY MEDICINE CLINIC | Facility: CLINIC | Age: 33
End: 2023-08-14

## 2023-08-14 NOTE — TELEPHONE ENCOUNTER
Fax received from [facility] stating that Nicotine kit id not available. They ask that you send individual Rx's for the three different strengths. Routed to Dr Rashad Godwin.

## 2023-08-17 RX ORDER — NICOTINE 21 MG/24HR
1 PATCH, TRANSDERMAL 24 HOURS TRANSDERMAL EVERY 24 HOURS
Qty: 7 EACH | Refills: 0 | Status: SHIPPED | OUTPATIENT
Start: 2023-08-17 | End: 2023-08-24

## 2023-09-05 ENCOUNTER — TELEPHONE (OUTPATIENT)
Dept: FAMILY MEDICINE CLINIC | Facility: CLINIC | Age: 33
End: 2023-09-05

## 2023-09-05 NOTE — TELEPHONE ENCOUNTER
Would it be possible to just give the pharmacy a verbal for whatever they have in the store?  thanks

## 2023-09-05 NOTE — TELEPHONE ENCOUNTER
LOV 8/7/2023. Nicotine Patches were ordered. Individual Rx for 3 different strengths sent in 8/14/23 encounter. Received fax from [employer] stating package size for covered brand comes in 14 not 7 and is asking to change the quantity for each strength to 14.     Routed to

## 2024-09-05 ENCOUNTER — HOSPITAL ENCOUNTER (OUTPATIENT)
Facility: HOSPITAL | Age: 34
Discharge: HOME | End: 2024-09-05
Attending: EMERGENCY MEDICINE | Admitting: OBSTETRICS & GYNECOLOGY

## 2024-09-05 VITALS
HEART RATE: 86 BPM | SYSTOLIC BLOOD PRESSURE: 112 MMHG | RESPIRATION RATE: 16 BRPM | OXYGEN SATURATION: 98 % | TEMPERATURE: 98.1 F | DIASTOLIC BLOOD PRESSURE: 70 MMHG | BODY MASS INDEX: 26.66 KG/M2 | WEIGHT: 160 LBS | HEIGHT: 65 IN

## 2024-09-05 DIAGNOSIS — O47.00 PRETERM CONTRACTIONS: Primary | ICD-10-CM

## 2024-09-05 PROBLEM — B96.89 BACTERIAL VAGINOSIS IN PREGNANCY: Status: ACTIVE | Noted: 2024-09-05

## 2024-09-05 PROBLEM — O23.599 BACTERIAL VAGINOSIS IN PREGNANCY: Status: ACTIVE | Noted: 2024-09-05

## 2024-09-05 LAB
AMPHET UR QL SCN: NEGATIVE
APPEARANCE UR: ABNORMAL
BACTERIA URNS QL MICRO: ABNORMAL /HPF
BARBITURATES UR QL SCN: NEGATIVE
BENZODIAZ UR QL: NEGATIVE
BILIRUB UR QL: NEGATIVE
CANNABINOIDS UR QL SCN: NEGATIVE
CLUE CELLS VAG QL WET PREP: ABNORMAL
COCAINE UR QL SCN: NEGATIVE
COLOR UR: ABNORMAL
EPITH CASTS URNS QL MICRO: ABNORMAL /LPF
FIBRONECTIN FETAL VAG QL: NEGATIVE
GLUCOSE BLD STRIP.AUTO-MCNC: 95 MG/DL (ref 65–117)
GLUCOSE UR STRIP.AUTO-MCNC: 100 MG/DL
HGB UR QL STRIP: NEGATIVE
HYALINE CASTS URNS QL MICRO: ABNORMAL /LPF (ref 0–2)
KETONES UR QL STRIP.AUTO: NEGATIVE MG/DL
LEUKOCYTE ESTERASE UR QL STRIP.AUTO: ABNORMAL
Lab: NORMAL
METHADONE UR QL: NEGATIVE
NITRITE UR QL STRIP.AUTO: NEGATIVE
OPIATES UR QL: NEGATIVE
PCP UR QL: NEGATIVE
PH UR STRIP: 6.5 (ref 5–8)
PROT UR STRIP-MCNC: NEGATIVE MG/DL
RBC #/AREA URNS HPF: ABNORMAL /HPF (ref 0–5)
SERVICE CMNT-IMP: NORMAL
SP GR UR REFRACTOMETRY: 1.02
T VAGINALIS VAG QL WET PREP: ABNORMAL
URINE CULTURE IF INDICATED: ABNORMAL
UROBILINOGEN UR QL STRIP.AUTO: 1 EU/DL (ref 0.2–1)
WBC URNS QL MICRO: ABNORMAL /HPF (ref 0–4)
YEAST: ABNORMAL

## 2024-09-05 PROCEDURE — 87210 SMEAR WET MOUNT SALINE/INK: CPT

## 2024-09-05 PROCEDURE — 80307 DRUG TEST PRSMV CHEM ANLYZR: CPT

## 2024-09-05 PROCEDURE — 4500000002 HC ER NO CHARGE

## 2024-09-05 PROCEDURE — 82731 ASSAY OF FETAL FIBRONECTIN: CPT

## 2024-09-05 PROCEDURE — 81001 URINALYSIS AUTO W/SCOPE: CPT

## 2024-09-05 PROCEDURE — 82962 GLUCOSE BLOOD TEST: CPT

## 2024-09-05 RX ORDER — METRONIDAZOLE 500 MG/1
500 TABLET ORAL 2 TIMES DAILY
Qty: 14 TABLET | Refills: 0 | Status: SHIPPED | OUTPATIENT
Start: 2024-09-05 | End: 2024-09-12

## 2024-09-05 ASSESSMENT — ENCOUNTER SYMPTOMS
NAUSEA: 0
SORE THROAT: 0
FACIAL SWELLING: 0
CONSTIPATION: 0
VOMITING: 0
BACK PAIN: 1
SHORTNESS OF BREATH: 0
COUGH: 0
DIARRHEA: 0
ABDOMINAL PAIN: 1
WHEEZING: 0

## 2024-09-05 NOTE — H&P
OB H&P    Patient: Barrington Warner Age: 34 y.o. Sex: female    YOB: 1990 Admit Date: 2024 PCP: No primary care provider on file.   MRN: [id number]  CSN: [id number]     Room: [address] [place] [address] Time Dictated: 4:49 PM        Chief Complaint   Vaginal discharge, low back pain and pelvic pressure    History of Present Illness   34 y.o.  at 29+4 by stated LMP of 12URP62.  Pt receives her care in [place], [place].  Moved to this area for a job 2 months ago.  Last prenatal visit was 20 weeks per pt (14 weeks per records obtained from the [facility]).  Getting care through the Health Department but now that she has insurance through her work at [employer], she is transferring as a regular patient to [facility] in [place].  She intends to deliver in [place].      Pt states she has a vaginal discharge for the last several weeks.  Does not need to wear a pad.  Denies it having an odor but does report intermittent itching.      Also reports LBP for the last couple of weeks that is constant but worse today as well as pelvic  pressure for the last couple of weeks but worse today.  Denies having actual contractions but caveats that \"she doesn't really know what a contraction feels like\".  Denies vaginal bleeding.  Denies recent intercourse or other vaginal manipulation.  Denies dysuria or odor.    Reports good FM.      Per records:  All prior term vaginal deliveries. G3) GBS infected infant.    Primary OB:  [facility].  Next routine OB appointment is 10 September.      PNC:    Limited PNC  Prior GBS infected child (will need GBS proph in labor)    PNL:  O pos  RPR NR  HBsAg neg  HIV NR  GC/Chlam neg/neg  RI  HCV Ab NR    OB History    Para Term  AB Living   5 4 4     4   SAB IAB Ectopic Molar Multiple Live Births             4      # Outcome Date GA Lbr Jonas/2nd Weight Sex Type Anes PTL Lv   5 Current         other components within normal limits   C.TRACHOMATIS N.GONORRHOEAE DNA   FETAL FIBRONECTIN   URINE DRUG SCREEN   POCT GLUCOSE     Final Diagnosis     33 yo .  No e/o PTL/PPROM.  Reassuring fetal status.  +BV.    Disposition     Discharge to home  F/U Sep  as scheduled or sooner prn  Flagyl 500 mg po bid for 7 days  PTL/PPROM precautions        MD Martha Ch MD, FACOG  [facility]  2024  4:49 PM      My signature above authenticates this document and my orders, the final    diagnosis(es), discharge prescription(s), and instructions in the Epic    record.  Nursing notes have been reviewed by myself.

## 2024-09-05 NOTE — ED PROVIDER NOTES
Brief Medical Screening Examination for OB patient at triage    HPI:AGE@ female 33 weeks pregnant complains of lower abd cramping and rush of vaginal fluids.  Pt is a  patient at 30 weeks.  Pt has had  labor with past pregnancy.  Pt has her OB from [place], no OB in this area.      ROS:     Vitals: No data found.      Physical Exam:  General appearance: No apparent distress.  Stable vitals.  Afebrile.  Skin exam: Warm and dry.  No pallor.  Neurologic: Alert and oriented.  Abdominal exam: Soft, nontender.  Gravid uterus.    Differential diagnosis: Labor, premature labor, threatened miscarriage, Bond-Mckeon contractions, uterine contractions in pregnancy, premature rupture of membranes.    This patient with a primary obstetrical chief complaint is stable and medically cleared for direct transfer to the OB Labor & Delivery unit for further monitoring and workup.  Medical screening exam is complete.    MD Mcihelle Lagos Christopher B, MD  [phone]

## 2024-09-05 NOTE — DISCHARGE INSTRUCTIONS
Keep scheduled appointment for next week: need to have Glucola completed.      Weeks 30 to 32 of Your Pregnancy: Care Instructions  Your baby is growing more every day. Its eyes can open and close, and it may have hair on its head. Your baby may sleep 20 to 45 minutes at a time and is more active at certain times.    You should feel your baby move several times every day. Your baby now turns less and kicks more.   This is a good time to tour your hospital or birthing center. You may also want to find childcare if needed.         To ease heartburn   Avoid foods that make your symptoms worse, such as chocolate, spicy foods, and caffeine.  Avoid bending over or lying down after meals.  Do not eat for 2 hours before bedtime.  Take antacids like Tums, but don't take ones that have sodium bicarbonate, magnesium trisilicate, or aspirin.        To care for large, swollen veins (varicose veins)   Try to avoid standing for long periods of time.  Sit with your feet propped up.  Wear support hose.  Get some exercise every day, like walking or swimming.  Counting your baby's kicks  Your doctor may ask you to count your baby's movements, such as kicks, flutters, or rolls.    Find a quiet place, and get comfortable. Write down your start time. Count your baby's movements (except hiccups). When your baby has moved 10 times, you can stop counting. Write down how many minutes it took.   If an hour goes by and you don't feel 10 movements, have something to eat or drink. Count for another hour. If you don't feel at least 10 movements in the 2-hour period, call your doctor.   Follow-up care is a key part of your treatment and safety. Be sure to make and go to all appointments, and call your doctor if you are having problems. It's also a good idea to know your test results and keep a list of the medicines you take.  Where can you learn more?  Go to https://www.healthwise.net/patientEd and enter X471 to learn more about \"Weeks 30 to 32 of

## 2024-09-05 NOTE — PROCEDURES
NST Procedure Note    Patient: Barrington Warner Age: 34 y.o. Sex: female    YOB: 1990 Admit Date: 9/5/2024 PCP: No primary care provider on file.   MRN: [id number]  CSN: [id number]  LOS: 0      Estimated Gestational Age: 29w4d     Indication for NST: PTL eval    15 x 15    Fetal Vital Signs:  Mode: External  Fetal Heart Rate: 120  Fetal Activity: Present  Variability: Moderate 6-25 bmp  Accelerations: Yes  Decelerations: No  FHR Interpretations: Category I    Non-Stress Test: reactive    Uterine Activity:  Mode: External  Frequency (min): none     Signed by:Martha Branham MD  9/5/2024 4:54 PM

## 2024-09-05 NOTE — PROGRESS NOTES
Pt arrived via EMS, pt c/o's of leakage of fluid, states that she has been leaking for the past few weeks but today she had pain in her back and felt vag pressure.  Pt is a  previous 4 vag deliveries, pt states they were all \"dry births\" and that she thinks the last one was around 36 weeks and the rest were term.  She states that her due date is 24.      Pt said that she just moved from [place] and was last seen at [facility] in [facility], when asked about signing for medical release pt stated that she goes through the health dept and they make her appt with the doctors office.  Pt signed record release for her records at the health dept.  Release faxed.  Pt states that she was going back to [place] on Friday to sign medical release from health dept to have her records transferred to [facility].     Pt says that she was last seen at [facility] at 20 weeks.  But that she has an appointment next week in [place].      Pt placed on monitor, VSS, pt denies any vag bleeding and states that baby has been moving.        1710: pt given d/c  instructions, pt d/c'd home.

## 2024-10-08 ENCOUNTER — HOSPITAL ENCOUNTER (OUTPATIENT)
Facility: HOSPITAL | Age: 34
Discharge: HOME | End: 2024-10-08
Attending: OBSTETRICS & GYNECOLOGY | Admitting: OBSTETRICS & GYNECOLOGY

## 2024-10-08 VITALS
HEART RATE: 79 BPM | OXYGEN SATURATION: 100 % | DIASTOLIC BLOOD PRESSURE: 63 MMHG | SYSTOLIC BLOOD PRESSURE: 107 MMHG | RESPIRATION RATE: 16 BRPM | TEMPERATURE: 98.6 F

## 2024-10-08 PROCEDURE — 4500000002 HC ER NO CHARGE

## 2024-10-08 PROCEDURE — 99212 OFFICE O/P EST SF 10 MIN: CPT

## 2024-10-08 PROCEDURE — 59025 FETAL NON-STRESS TEST: CPT

## 2024-10-08 NOTE — PROCEDURES
Non-Stress Test    Brief history, indication:  34 week IUP; pelvic pressure    Findings:  Baseline  bpm; moderate variability; greater than two accelerations to 150 bpm; no significant decelerations noted.    Result: Reactive NST    Nonstress test interpreted by myself      Carlo Quevedo MD

## 2024-10-08 NOTE — H&P
OB History & Physical    Name: Barrington Warner MRN: [id number]  SSN: [id number]    YOB: 1990  Age: 34 y.o.  Sex: female      Subjective:     Reason for Admission:  Pregnancy and pressure    History of Present Illness: Barrington Warner is a 34 y.o.  female with an estimated gestational age of 34w2d with Estimated Date of Delivery: 24. Patient complains of pelvic pressure for about 24 hours.  She denies bleeding, ROM, has occasional cramping but no regular contractions.  She states she obtains care in [place], has an appointment there Oct 18, and plans to deliver there but currently lives and works locally.  The patient denies previous complications with this pregnancy.  Previous OB:  [facility] X 4    OB History          5    Para   4    Term   4            AB        Living   4         SAB        IAB        Ectopic        Molar        Multiple        Live Births   4              No past medical history on file.  No past surgical history on file.  Social History     Occupational History    Not on file   Tobacco Use    Smoking status: Never    Smokeless tobacco: Never   Substance and Sexual Activity    Alcohol use: Not on file    Drug use: Not on file    Sexual activity: Not on file     No family history on file.  SH:  Patient denies tobacco, alcohol, recreational drugs.  .    FH: reviewed and negative    Review of systems:    General: Denies fever, sweats, chills  CV: Denies CP, palpitations  Resp: Denies SOB, cough  GI: Denies nausea, vomiting, diarrhea  : Denies dysura, abnormal frequency, hematuria  Neuro: Denies HA, visual disturbance  All other systems reviewed and are negative    Allergies   Allergen Reactions    [place]      Prior to Admission medications    Not on File        Objective:     Vitals:    Vitals:    10/08/24 1542 10/08/24 1606   BP: 116/77 107/63   Pulse: 91 79   Resp: 18 16   Temp: 98.1 °F (36.7 °C) 98.6 °F (37 °C)   TempSrc:  Oral   SpO2: 100%

## 2024-10-08 NOTE — DISCHARGE INSTRUCTIONS
Weeks 34 to 36 of Your Pregnancy: Care Instructions  Your belly has grown quite large. It's almost time to give birth! Your baby's lungs are almost ready to breathe air. The skull bones are firm enough to protect your baby's head. But they're soft enough to move down through the birth canal.    You might be wondering what to expect during labor. Because each birth is different, there's no way to know exactly what childbirth will be like for you. Talk to your doctor or midwife about any concerns you have.   You'll probably have a test for group B streptococcus (GBS). GBS is bacteria that can live in the vagina and rectum. GBS can make your baby sick after birth. If you test positive, you'll get antibiotics during labor.     Choose what type of pain relief you would like during labor.  You can choose from a few types, including medicine and non-medicine options. You may want to use several types of pain relief.     Know how medicines can help with pain during labor.  Some medicines lower anxiety and help with some of the pain. Others make your lower body numb so that you will feel less pain.     Tell your doctor about your pain medicine choice.  Do this before you start labor or very early in your labor. You may be able to change your mind during labor.     Learn about the stages of labor.    The first stage includes the early (latent) and active phases of labor. Contractions start in early labor. During active labor, contractions get stronger, last longer, and happen more often. And the cervix opens more rapidly.  The second stage starts when you're ready to push. During this stage, your baby is born.  During the third stage, you'll usually have a few more contractions to push out the placenta.   Follow-up care is a key part of your treatment and safety. Be sure to make and go to all appointments, and call your doctor if you are having problems. It's also a good idea to know your test results and keep a list of

## (undated) NOTE — ED AVS SNAPSHOT
Kim Dudley   MRN: [id number]    Department:  [facility] Emergency Department   Date of Visit:  2/11/2019           Disclosure     Insurance plans vary and the physician(s) referred by the ER may not be covered by your plan.  Please contact tell this physician (or your personal doctor if your instructions are to return to your personal doctor) about any new or lasting problems. The primary care or specialist physician will see patients referred from the [facility] Emergency Department.  Bryan Blakely

## (undated) NOTE — LETTER
Date & Time: 4/27/2023, 8:38 PM  Patient: Teresa Edwards  Encounter Provider(s):    Logan Edwards MD       To Whom It May Concern:    Teresa Edwards was seen and treated in our department on 4/27/2023. She may return to work April 28 2023.     If you have any questions or concerns, please do not hesitate to call.        _____________________________  Physician/APC Signature

## (undated) NOTE — LETTER
Date & Time: 3/25/2019, 3:26 PM  Patient: Danielle Hilton  Encounter Provider(s):    Sylvia Bradley       To Whom It May Concern:    Danielle Hilton was seen and treated in our department on 3/25/2019.  She should not return to work until 3

## (undated) NOTE — ED AVS SNAPSHOT
Aditya Fang   MRN: [id number]    Department:  [facility] Emergency Department   Date of Visit:  7/8/2019           Disclosure     Insurance plans vary and the physician(s) referred by the ER may not be covered by your plan.  Please contact tell this physician (or your personal doctor if your instructions are to return to your personal doctor) about any new or lasting problems. The primary care or specialist physician will see patients referred from the [facility] Emergency Department.  Natalie Cruz

## (undated) NOTE — ED AVS SNAPSHOT
Danielle Hilton   MRN: [id number]    Department:  [facility] Emergency Department   Date of Visit:  7/12/2019           Disclosure     Insurance plans vary and the physician(s) referred by the ER may not be covered by your plan.  Please contact tell this physician (or your personal doctor if your instructions are to return to your personal doctor) about any new or lasting problems. The primary care or specialist physician will see patients referred from the [facility] Emergency Department.  Emelia Thompson

## (undated) NOTE — LETTER
Date & Time: 5/3/2023, 12:49 PM  Patient: Brian Carreon  Encounter Provider(s):    Abiola Julien DO       To Whom It May Concern:    Brian Carreon was seen and treated in our department on 5/3/2023. She can return to work with these limitations: light duty that doesn't involve driving nor bending down nor heavy lifting/pushing/pulling for 1 wk .     If you have any questions or concerns, please do not hesitate to call.        _____________________________  Physician/APC Signature

## (undated) NOTE — Clinical Note
FYI   EEG was normal for this patient.   Thanks Mary Franco MD, Neurology [facility] Pager [phone] 6/16/2023

## (undated) NOTE — ED AVS SNAPSHOT
Jeremy Marina   MRN: [id number]    Department:  [facility] Emergency Department   Date of Visit:  9/24/2019           Disclosure     Insurance plans vary and the physician(s) referred by the ER may not be covered by your plan.  Please contact tell this physician (or your personal doctor if your instructions are to return to your personal doctor) about any new or lasting problems. The primary care or specialist physician will see patients referred from the [facility] Emergency Department.  Juan Francisco Bryant

## (undated) NOTE — MR AVS SNAPSHOT
After Visit Summary   2023    Kwesi Napier   MRN: [id number]           Visit Information     Date & Time  2023 10:30 AM Provider  [address] [place] [address] EEG Dept. Phone  [phone] Were     LMP   2023 (Exact Date)             Allergies as of 2023  Review status set to Review Complete on 2023       Noted Reaction Type Reactions    Latex 10/22/2019    HIVES    [place] 2019    ANAPHYLAXIS      Your Current Medications        Dosage    amitriptyline 10 MG Oral Tab Start at 10 mg nightly x1 week, then increase to 20 mg nightly    ubrogepant (UBRELVY) 50 MG Oral Tab Take one tablet at onset of migraine. May take additional tablet in 2 hours if needed. Do not exceed two tablets per 24 hour period. ibuprofen 600 MG Oral Tab () Take 1 tablet (600 mg total) by mouth every 8 (eight) hours as needed for Pain or Fever. meclizine 25 MG Oral Tab Take 1 tablet (25 mg total) by mouth 3 (three) times daily as needed. Diagnoses for This Visit    Vertigo   [303767]    Tinnitus of both ears   [723021]             We Ordered the Following     Normal Orders This Visit    EEG [NEU4 CUSTOM]       Future Appointments        Provider Department    2023 1:00 PM [facility], [address] [place] [address]                Did you know that [facility] primary care physicians now offer Video Visits through [address] for adult patients for a variety of conditions such as allergies, back pain and cold symptoms? Skip the drive and waiting room and online chat with a doctor face-to-face using your web-cam enabled computer or mobile device wherever you are. Video Visits cost $50 and can be paid hassle-free using a credit, debit, or health savings card. Not active on [employer]? Ask us how to get signed up today! If you receive a survey from [employer], please take a few minutes to complete it and provide feedback. We strive to deliver the best patient experience and are looking for ways to make improvements. Your feedback will help us do so. For more information on [employer], please visit [email or web address]           No text in SmartText           No text in SmartText

## (undated) NOTE — ED AVS SNAPSHOT
Paradise Clark   MRN: [id number]    Department:  [facility] Emergency Department   Date of Visit:  3/25/2019           Disclosure     Insurance plans vary and the physician(s) referred by the ER may not be covered by your plan.  Please contact tell this physician (or your personal doctor if your instructions are to return to your personal doctor) about any new or lasting problems. The primary care or specialist physician will see patients referred from the [facility] Emergency Department.  Jeremie Her

## (undated) NOTE — LETTER
Date & Time: 6/22/2023, 4:40 PM  Patient: Twin Diss  Encounter Provider(s):    ANUEL Armas       To Whom It May Concern:    Saintclair Leaf was seen and treated in our department on 6/22/2023. She may return to work once symptoms have improved.     If you have any questions or concerns, please do not hesitate to call.        _____________________________  Physician/[facility] Signature